# Patient Record
Sex: FEMALE | NOT HISPANIC OR LATINO | Employment: OTHER
[De-identification: names, ages, dates, MRNs, and addresses within clinical notes are randomized per-mention and may not be internally consistent; named-entity substitution may affect disease eponyms.]

---

## 2017-05-24 ENCOUNTER — RECORDS - HEALTHEAST (OUTPATIENT)
Dept: ADMINISTRATIVE | Facility: OTHER | Age: 77
End: 2017-05-24

## 2017-05-31 ENCOUNTER — AMBULATORY - HEALTHEAST (OUTPATIENT)
Dept: HEALTH INFORMATION MANAGEMENT | Facility: CLINIC | Age: 77
End: 2017-05-31

## 2017-06-07 ENCOUNTER — RECORDS - HEALTHEAST (OUTPATIENT)
Dept: ADMINISTRATIVE | Facility: OTHER | Age: 77
End: 2017-06-07

## 2018-05-25 ENCOUNTER — TRANSFERRED RECORDS (OUTPATIENT)
Dept: HEALTH INFORMATION MANAGEMENT | Facility: CLINIC | Age: 78
End: 2018-05-25

## 2018-10-01 ENCOUNTER — TELEPHONE (OUTPATIENT)
Dept: RHEUMATOLOGY | Facility: CLINIC | Age: 78
End: 2018-10-01

## 2018-10-02 NOTE — TELEPHONE ENCOUNTER
Reason for call:  Other   Patient called regarding (reason for call): appointment  Additional comments: Patient is currently seeing a rheumatologist at [facility] and is looking for a new doctor. She has Arthritis and is wondering if Dr. Villa can take her on as a new patient. He is booked for the next couple of months, she want's to know if she can be squeezed into his schedule earlier, or be put onto a cancellation list. Please call patient and follow up. Thanks!    Phone number to reach patient:  Home number on file [phone] (home)    Best Time:  anytime    Can we leave a detailed message on this number?  YES

## 2018-10-05 NOTE — TELEPHONE ENCOUNTER
Rheumatology team: Please call to notify Ms. Palma that she could be added to a wait list.  I do not have an opening available at this time in the next couple months.    Phill Villa MD  10/5/2018 6:32 AM

## 2018-10-05 NOTE — TELEPHONE ENCOUNTER
Left  notifying patient of message below.  Provided her with our call back number w/any questions.    Osei Rodriguez RN....10/5/2018 10:51 AM

## 2019-02-14 ENCOUNTER — ANCILLARY PROCEDURE (OUTPATIENT)
Dept: GENERAL RADIOLOGY | Facility: CLINIC | Age: 79
End: 2019-02-14
Attending: INTERNAL MEDICINE
Payer: COMMERCIAL

## 2019-02-14 ENCOUNTER — TELEPHONE (OUTPATIENT)
Dept: RHEUMATOLOGY | Facility: CLINIC | Age: 79
End: 2019-02-14

## 2019-02-14 ENCOUNTER — OFFICE VISIT (OUTPATIENT)
Dept: RHEUMATOLOGY | Facility: CLINIC | Age: 79
End: 2019-02-14
Payer: COMMERCIAL

## 2019-02-14 VITALS
DIASTOLIC BLOOD PRESSURE: 80 MMHG | SYSTOLIC BLOOD PRESSURE: 162 MMHG | TEMPERATURE: 96.3 F | HEART RATE: 120 BPM | OXYGEN SATURATION: 100 % | WEIGHT: 134.6 LBS

## 2019-02-14 DIAGNOSIS — M54.2 NECK PAIN: ICD-10-CM

## 2019-02-14 DIAGNOSIS — M05.79 RHEUMATOID ARTHRITIS INVOLVING MULTIPLE SITES WITH POSITIVE RHEUMATOID FACTOR (H): ICD-10-CM

## 2019-02-14 DIAGNOSIS — M19.042 PRIMARY OSTEOARTHRITIS OF BOTH HANDS: ICD-10-CM

## 2019-02-14 DIAGNOSIS — M54.2 NECK PAIN: Primary | ICD-10-CM

## 2019-02-14 DIAGNOSIS — M19.041 PRIMARY OSTEOARTHRITIS OF BOTH HANDS: ICD-10-CM

## 2019-02-14 PROBLEM — M81.0 SENILE OSTEOPOROSIS: Status: ACTIVE | Noted: 2019-02-14

## 2019-02-14 PROBLEM — H90.3 SENSORINEURAL HEARING LOSS, BILATERAL: Status: ACTIVE | Noted: 2018-01-09

## 2019-02-14 PROCEDURE — 73130 X-RAY EXAM OF HAND: CPT | Mod: 59

## 2019-02-14 PROCEDURE — 99204 OFFICE O/P NEW MOD 45 MIN: CPT | Performed by: INTERNAL MEDICINE

## 2019-02-14 PROCEDURE — 73630 X-RAY EXAM OF FOOT: CPT | Mod: 59

## 2019-02-14 PROCEDURE — 72050 X-RAY EXAM NECK SPINE 4/5VWS: CPT

## 2019-02-14 RX ORDER — ERGOCALCIFEROL (VITAMIN D2) 10 MCG
TABLET ORAL
COMMUNITY

## 2019-02-14 RX ORDER — HYDROCHLOROTHIAZIDE 25 MG/1
25 TABLET ORAL DAILY
Refills: 3 | COMMUNITY
Start: 2019-02-05

## 2019-02-14 RX ORDER — DICLOFENAC SODIUM 75 MG/1
75 TABLET, DELAYED RELEASE ORAL 2 TIMES DAILY
Refills: 1 | COMMUNITY
Start: 2019-01-11 | End: 2022-04-07

## 2019-02-14 RX ORDER — LEVOTHYROXINE SODIUM 88 UG/1
88 TABLET ORAL DAILY
COMMUNITY
Start: 2019-01-30 | End: 2022-10-14 | Stop reason: DRUGHIGH

## 2019-02-14 RX ORDER — MULTIVITAMIN WITH IRON
1 TABLET ORAL DAILY
COMMUNITY

## 2019-02-14 RX ORDER — LOSARTAN POTASSIUM 50 MG/1
75 TABLET ORAL DAILY
COMMUNITY
Start: 2019-01-11

## 2019-02-14 SDOH — HEALTH STABILITY: MENTAL HEALTH: HOW OFTEN DO YOU HAVE A DRINK CONTAINING ALCOHOL?: NEVER

## 2019-02-14 ASSESSMENT — PAIN SCALES - GENERAL: PAINLEVEL: NO PAIN (0)

## 2019-02-14 NOTE — NURSING NOTE
RAPID3 (0-30) Cumulative Score  5.0          RAPID3 Weighted Score (divide #4 by 3 and that is the weighted score)  1.7       Nelda to follow up with Primary Care provider regarding elevated blood pressure.

## 2019-02-14 NOTE — PROGRESS NOTES
Rheumatology Clinic Visit      Nelda Palma MRN# [id number]   YOB: 1940 Age: 78 year old      Date of visit: 2/14/19   PCP: Dr. Shawn Bradley at the [facility]  Endocrinologist: Dr. Aditya De Leon at [facility].     Chief Complaint   Patient presents with:  RECHECK    Assessment and Plan     1.  Seropositive rheumatoid arthritis (, CCP >2776.8): Reportedly diagnosed in 2004.  Only treatment has been hydroxychloroquine that was effective in the past and tapered off over time.  Currently not on DMARD therapy.  Active synovitis in the hands and wrists.  Evaluate with x-rays for erosive disease.  If she has erosive disease then will start methotrexate 15 mg once weekly.  If no erosive disease then will start hydroxychloroquine as long as her ophthalmologist is okay with it, as the patient raise concern that her ophthalmologist said that Plaquenil is bad for her eyes.  Will request last clinic note and asked Dr. Smith (her ophthalmologist) if any contraindication to starting Plaquenil.  - X-rays today: bilateral hands and feet    # Hydroxychloroquine (Plaquenil) Risks and Benefits:  The risks and benefits of hydroxychloroquine were discussed in detail and the patient verbalized understanding; the patient also verbalized agreement to get the required ophthalmologic toxicity monitoring.  The risks discussed include, but are not limited to, the risk for hypersensitivity, anaphylaxis, anaphylactoid reactions, irreversible retinal damage, rare hematologic reactions, and rare cardiomyopathy. I encouraged reviewing the package insert and asking any questions about the medication.      # Methotrexate Risks and Benefits: The risks and benefits of methotrexate were discussed in detail and the patient verbalized understanding.  The risks discussed include, but are not limited to, the risk for hypersensitivity, anaphylaxis, anaphylactoid reactions, infections, bone marrow suppression, renal  toxicity, hepatotoxicity, pulmonary toxicity, malignancy, impaired fertility, GI upset, alopecia, and oral and nasal sores.  Folic acid supplementation is recommended during methotrexate therapy to help prevent some of the side effects. Pregnancy prevention and planning was discussed; it is recommended that women of childbearing potential use reliable contraception during therapy.  The risks of taking both methotrexate and alcohol were reviewed; complete alcohol avoidance was discussed.  Routine laboratory monitoring is required during methotrexate therapy. Taking MTX once weekly, all within a 24 hour period was stressed and the patient verbalized this instruction back to me.  I encouraged reviewing the package insert and asking any questions about the medication.    2. Neck pain in the setting of RA: reportedly a chronic issue for years.  Check x-rays to eval for instability.  - X-rays today: C-spine, including flexion and extension views    3. Elevated blood pressure:  Nelda to follow up with Primary Care provider regarding elevated blood pressure.     4. Osteoporosis: Managed by her endocrinologist, Dr. Aditya De Leon at [facility].     Ms. Palma verbalized agreement with and understanding of the rational for the diagnosis and treatment plan.  All questions were answered to best of my ability and the patient's satisfaction. Ms. Palma was advised to contact the clinic with any questions that may arise after the clinic visit.      Thank you for involving me in the care of the patient    Return to clinic: 3-4 months      HPI   Nelda Palma is a 78 year old female with a past medical history significant for hypertension, left TKA, migraines, rosacea, bilateral cataract surgery in 2009, hypothyroidism, senile osteoporosis, and rheumatoid arthritis who is seen for initial rheumatology evaluation in this clinic.      1/25/2016 [facility] rheumatology clinic note by Dr. Albin Sanchez documents that the patient has arthritis in her  hands.  Has osteoporosis and has been getting Reclast infusions.  In the assessment and plan he documents history of rheumatoid arthritis but no evidence of inflammatory arthritis disease activity at that time.  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  Left TKA.  Hydroxychloroquine was reduced to 200 mg every other day.    Outside labs from January 2019 show an elevated calcium at 10.7, normal creatinine, elevated total protein, normal white blood cell count, normal hemoglobin, normal platelet count.  2/9/2018 1,25 dihydroxy vitamin D 32.  2/8/2018 TB test negative.  2/8/2018 CCP antibody >2776.8, .  2/7/2018 hepatitis B surface antigen negative.  2/7/2018 hepatitis C antibody negative    Today, Ms. Palma reports that she stopped hydroxychloroquine 3 years ago with no change in symptoms, but in the last year she has had worsening pain and stiffness in her hands and elbows.  Occasionally with neck pain that does not radiate; described as tightness and improves with ROM exercises. Feet pain on the bottom that occur with only the first few steps.  RA first dx'd 2004.     Osteoporosis is being tx'd with Reclast with the last dose last year; follows with endocrinologist Dr. Aditya De Leon at [place].     Denies fevers, chills, nausea, vomiting, constipation, diarrhea. No abdominal pain. No chest pain/pressure, palpitations, or shortness of breath. No LE swelling. No neck pain. No oral or nasal sores.  No rash. No sicca symptoms. No photosensitivity or photophobia. No eye pain or redness. No history of inflammatory eye disease.  No history of inflammatory bowel disease.  No history of DVT, pulmonary embolism, or miscarriage.   No history of serositis.  No history of Raynaud's Phenomenon.  No seizure history.  No known renal disorder.      Tobacco: none  EtOH: none  Drugs: none  Occupation: [employer], now retired    ROS   GEN: No fevers, chills, night sweats, or weight change  SKIN: No itching, rashes,  sores  HEENT: No epistaxis. No oral or nasal ulcers.  CV: No chest pain, pressure, palpitations, or dyspnea on exertion.  PULM: No SOB, wheeze, cough.  GI: No nausea, vomiting, constipation, diarrhea. No blood in stool. No abdominal pain.  : No blood in urine.  MSK: See HPI.  NEURO: No numbness or tingling  ENDO: No heat/cold intolerance.  EXT: No LE swelling  PSYCH: Negative    Active Problem List     Patient Active Problem List   Diagnosis     Actinic keratosis     Essential hypertension     GA (granuloma annulare)     Headache     Hypothyroidism     Meningioma (H)     Rheumatoid arthritis involving both hands with positive rheumatoid factor (H)     Rosacea     Senile osteoporosis     Sensorineural hearing loss, bilateral     Past Medical History   See above    Past Surgical History   Left TKA    Allergy     Allergies   Allergen Reactions     Oxycodone-Acetaminophen Anxiety     After TKA she developed marked post operative anxiety felt to be due to the Percocet; took it again after going home and symptoms recurred.     Penicillins Rash     Current Medication List     Current Outpatient Medications   Medication Sig     diclofenac (VOLTAREN) 1 % topical gel Apply up to 4 times per day to the hands as needed; no more than 2 grams per application to the upper extremities.     levothyroxine (SYNTHROID/LEVOTHROID) 88 MCG tablet Take 88 mcg by mouth daily     losartan (COZAAR) 50 MG tablet Take 75 mg by mouth daily     magnesium 250 MG tablet Take 1 tablet by mouth daily     Vitamin D, Cholecalciferol, 400 units TABS      diclofenac (VOLTAREN) 75 MG EC tablet Take 75 mg by mouth 2 times daily     hydrochlorothiazide (HYDRODIURIL) 25 MG tablet Take 25 mg by mouth daily     No current facility-administered medications for this visit.      Social History   See HPI    Family History     Family History   Problem Relation Age of Onset     Diabetes Mother      Diabetes Father        Physical Exam     Temp Readings from Last 3  Encounters:   02/14/19 96.3  F (35.7  C) (Oral)     BP Readings from Last 5 Encounters:   02/14/19 162/80     Pulse Readings from Last 1 Encounters:   02/14/19 120     Resp Readings from Last 1 Encounters:   No data found for Resp     There is no height or weight on file to calculate BMI.    GEN: NAD  HEENT: MMM. No oral lesions. Anicteric, noninjected sclera  CV: S1, S2. RRR. No m/r/g.  PULM: CTA bilaterally. No w/c.  ABD: +BS.   MSK: Mild synovial swelling and tenderness palpation of the bilateral second and fifth MCPs and PIPs.  Also with bony hypertrophy of the PIPs and DIPs that were mildly tender to palpation.  No soft tissue swelling at the DIPs.  Bilateral wrists with moderate synovial swelling and tenderness palpation but no increased warmth or overlying erythema.  Elbows and shoulders without swelling or tenderness palpation.  Hips nontender to palpation.  Knees and ankles without swelling or tenderness palpation.  MTPs diffusely tender to palpation but without swelling.  No dactylitis.   NEURO: UE and LE strengths 5/5 and equal bilaterally.   SKIN: No rash.  No nail pitting.  EXT: No LE edema  PSYCH: Alert. Appropriate.    Labs / Imaging (select studies)   Labs reviewed in care everywhere    Immunization History     There is no immunization history on file for this patient.       Chart documentation done in part with Dragon Voice recognition Software. Although reviewed after completion, some word and grammatical error may remain.    Phill Villa MD

## 2019-02-14 NOTE — Clinical Note
Please fax my clinic note dated 2/14/2019 to Ms. Palma's [facility]CP[facility] carson MD2/14/2019 3:07 PM

## 2019-02-14 NOTE — TELEPHONE ENCOUNTER
Left message for Dr. Smith, Is there any contraindication for Hydroxychloroquine.   ARTEM has also been faxed for records. ADOLFO Blackman from [facility] returning call from message above. Please call back at [phone]

## 2019-02-14 NOTE — PROGRESS NOTES
Nelda to follow up with Primary Care provider regarding elevated blood pressure.    Penelope Denton, JOAQUIN  2/14/2019  11:42 AM

## 2019-02-15 ENCOUNTER — TELEPHONE (OUTPATIENT)
Dept: RHEUMATOLOGY | Facility: CLINIC | Age: 79
End: 2019-02-15

## 2019-02-15 DIAGNOSIS — Z11.59 ENCOUNTER FOR SCREENING FOR OTHER VIRAL DISEASES: ICD-10-CM

## 2019-02-15 DIAGNOSIS — Z79.899 HIGH RISK MEDICATION USE: ICD-10-CM

## 2019-02-15 DIAGNOSIS — M05.79 RHEUMATOID ARTHRITIS INVOLVING MULTIPLE SITES WITH POSITIVE RHEUMATOID FACTOR (H): Primary | ICD-10-CM

## 2019-02-15 RX ORDER — METHOTREXATE 2.5 MG/1
15 TABLET ORAL WEEKLY
Qty: 24 TABLET | Refills: 3 | Status: SHIPPED | OUTPATIENT
Start: 2019-02-15 | End: 2019-05-21

## 2019-02-15 RX ORDER — FOLIC ACID 1 MG/1
1 TABLET ORAL DAILY
Qty: 100 TABLET | Refills: 3 | Status: SHIPPED | OUTPATIENT
Start: 2019-02-15 | End: 2019-06-06

## 2019-02-15 NOTE — TELEPHONE ENCOUNTER
Rheumatology team: Please call to notify Ms. Palma that hand x-rays showed erosions. Mild osteoarthritis of the feet. Cervical spine x-rays did not show evidence of instability.   Because there are erosions, methotrexate will be used.  We discussed methotrexate at her appointment, but please restate that methotrexate is given once weekly: 15mg (6 tablets) once weekly on the same day each week, and folic acid 1mg daily.  Labs are needed once monthly for the next 3 months; please assist her with scheduling the lab appointments.    - Start methotrexate 15mg (6 tablets) once weekly  - Start folic acid 1mg daily  - Labs in 1 month: CBC, Cr, Hepatic Panel, Hepatitis B core ab  - Labs in 2 months: CBC, Cr, Hepatic Panel  - Labs in 3 months: CBC, Creatinine, Hepatic Panel, ESR, CRP    Phill Villa MD  2/15/2019 1:28 PM

## 2019-02-15 NOTE — TELEPHONE ENCOUNTER
Called and left vm for pt to return call to [phone].    Osei Rodriguez RN....2/15/2019 1:32 PM

## 2019-02-25 ENCOUNTER — TELEPHONE (OUTPATIENT)
Dept: RHEUMATOLOGY | Facility: CLINIC | Age: 79
End: 2019-02-25

## 2019-02-25 NOTE — TELEPHONE ENCOUNTER
Reason for call:  Other   Patient called regarding (reason for call): call back  Additional comments: Patient has questions regarding medications Dr. Villa prescribed.    Phone number to reach patient:  Home number on file [phone] (home)    Best Time:  ASAP    Can we leave a detailed message on this number?  YES

## 2019-02-25 NOTE — TELEPHONE ENCOUNTER
Called pt and informed her of Dr. Baker notes form [facility]. She will take the methotrexate for 3 months and come in for a follow up.  April St Yon NUÑEZ 2/25/2019 3:53 PM

## 2019-02-28 ENCOUNTER — TELEPHONE (OUTPATIENT)
Dept: RHEUMATOLOGY | Facility: CLINIC | Age: 79
End: 2019-02-28

## 2019-02-28 NOTE — TELEPHONE ENCOUNTER
Reason for call:  Other   Patient called regarding (reason for call): call back  Additional comments: Patient does labs at [facility] - would like the orders sent there. Dr. Gomez.    Phone number to reach patient:  Home number on file [phone] (home)    Best Time:  ASAP    Can we leave a detailed message on this number?  YES

## 2019-03-04 NOTE — TELEPHONE ENCOUNTER
Labs have been faxed to [facility] (fax number [phone])  Patient has been notified.  Abi Sauceda CMA Rheumatology  3/4/2019 10:05 AM

## 2019-03-08 DIAGNOSIS — M05.79 RHEUMATOID ARTHRITIS INVOLVING MULTIPLE SITES WITH POSITIVE RHEUMATOID FACTOR (H): ICD-10-CM

## 2019-03-08 RX ORDER — METHOTREXATE 2.5 MG/1
15 TABLET ORAL WEEKLY
Qty: 24 TABLET | Refills: 3
Start: 2019-03-08

## 2019-03-11 ENCOUNTER — TELEPHONE (OUTPATIENT)
Dept: RHEUMATOLOGY | Facility: CLINIC | Age: 79
End: 2019-03-11

## 2019-03-11 DIAGNOSIS — M19.041 PRIMARY OSTEOARTHRITIS OF BOTH HANDS: ICD-10-CM

## 2019-03-11 DIAGNOSIS — M19.042 PRIMARY OSTEOARTHRITIS OF BOTH HANDS: ICD-10-CM

## 2019-03-11 NOTE — TELEPHONE ENCOUNTER
Reason for Call:  Other call back    Detailed comments: [facility] calling, order sent but need diagnostic code. Please advise.     Phone Number Patient can be reached at: Other phone number:  [phone]    Best Time: before 3     Can we leave a detailed message on this number? NO    Call taken on 3/11/2019 at 1:54 PM by Nicolas Sanchez

## 2019-03-11 NOTE — TELEPHONE ENCOUNTER
Called [facility] and provided diagnoses codes for labs.    Osei Rodriguez RN....3/11/2019 2:03 PM      Declines

## 2019-03-20 ENCOUNTER — TRANSFERRED RECORDS (OUTPATIENT)
Dept: HEALTH INFORMATION MANAGEMENT | Facility: CLINIC | Age: 79
End: 2019-03-20

## 2019-03-20 LAB
ALT SERPL-CCNC: 11 IU/L (ref 8–45)
AST SERPL-CCNC: 22 IU/L (ref 2–40)
CREAT SERPL-MCNC: 0.82 MG/DL (ref 0.57–1.11)
GFR SERPL CREATININE-BSD FRML MDRD: >60 ML/MIN/1.73M2

## 2019-03-29 ENCOUNTER — TELEPHONE (OUTPATIENT)
Dept: RHEUMATOLOGY | Facility: CLINIC | Age: 79
End: 2019-03-29

## 2019-03-29 NOTE — TELEPHONE ENCOUNTER
Rheumatology team: Please call to notify Ms. Palma that 3/21/2019 labs from [facility] did not show evidence of medication toxicity.     Phill Villa MD  3/29/2019 4:05 PM

## 2019-04-01 NOTE — TELEPHONE ENCOUNTER
Notified patient of lab results, patient understood and had no questions.  Abi Sauceda CMA Rheumatology  4/1/2019 3:52 PM

## 2019-05-15 ENCOUNTER — TRANSFERRED RECORDS (OUTPATIENT)
Dept: HEALTH INFORMATION MANAGEMENT | Facility: CLINIC | Age: 79
End: 2019-05-15

## 2019-05-15 LAB
ALT SERPL-CCNC: 9 IU/L (ref 8–45)
AST SERPL-CCNC: 20 IU/L (ref 2–40)
CREAT SERPL-MCNC: 0.83 MG/DL (ref 0.57–1.11)
GFR SERPL CREATININE-BSD FRML MDRD: >60 ML/MIN/1.73M2

## 2019-05-21 DIAGNOSIS — M05.79 RHEUMATOID ARTHRITIS INVOLVING MULTIPLE SITES WITH POSITIVE RHEUMATOID FACTOR (H): ICD-10-CM

## 2019-05-21 RX ORDER — METHOTREXATE 2.5 MG/1
15 TABLET ORAL WEEKLY
Qty: 24 TABLET | Refills: 0 | Status: SHIPPED | OUTPATIENT
Start: 2019-05-21 | End: 2019-06-06

## 2019-05-22 NOTE — TELEPHONE ENCOUNTER
Rheumatology team: Please call to notify Ms. Palma that methotrexate has been refilled.  Phill Villa MD  5/21/2019 10:02 PM

## 2019-05-22 NOTE — TELEPHONE ENCOUNTER
Contacted Pt, notified Ms. Palma that methotrexate has been refilled.  Pt had no questions or concerns, agrees and understands.    Penelope Denton, CMA  5/22/2019  1:10 PM

## 2019-06-06 ENCOUNTER — OFFICE VISIT (OUTPATIENT)
Dept: RHEUMATOLOGY | Facility: CLINIC | Age: 79
End: 2019-06-06
Payer: COMMERCIAL

## 2019-06-06 VITALS
WEIGHT: 132.2 LBS | SYSTOLIC BLOOD PRESSURE: 122 MMHG | HEART RATE: 65 BPM | TEMPERATURE: 97.9 F | DIASTOLIC BLOOD PRESSURE: 77 MMHG | OXYGEN SATURATION: 98 %

## 2019-06-06 DIAGNOSIS — M05.79 RHEUMATOID ARTHRITIS INVOLVING MULTIPLE SITES WITH POSITIVE RHEUMATOID FACTOR (H): Primary | ICD-10-CM

## 2019-06-06 DIAGNOSIS — Z79.899 HIGH RISK MEDICATION USE: ICD-10-CM

## 2019-06-06 DIAGNOSIS — M72.2 PLANTAR FASCIITIS, BILATERAL: ICD-10-CM

## 2019-06-06 DIAGNOSIS — M25.561 CHRONIC PAIN OF BOTH KNEES: ICD-10-CM

## 2019-06-06 DIAGNOSIS — M54.2 NECK PAIN: ICD-10-CM

## 2019-06-06 DIAGNOSIS — M25.562 CHRONIC PAIN OF BOTH KNEES: ICD-10-CM

## 2019-06-06 DIAGNOSIS — G89.29 CHRONIC PAIN OF BOTH KNEES: ICD-10-CM

## 2019-06-06 PROCEDURE — 99214 OFFICE O/P EST MOD 30 MIN: CPT | Performed by: INTERNAL MEDICINE

## 2019-06-06 RX ORDER — FOLIC ACID 1 MG/1
1 TABLET ORAL DAILY
Qty: 100 TABLET | Refills: 3 | Status: SHIPPED | OUTPATIENT
Start: 2019-06-06 | End: 2020-02-06

## 2019-06-06 RX ORDER — METHOTREXATE 2.5 MG/1
15 TABLET ORAL WEEKLY
Qty: 24 TABLET | Refills: 3 | Status: SHIPPED | OUTPATIENT
Start: 2019-06-06 | End: 2019-09-23

## 2019-06-06 ASSESSMENT — PAIN SCALES - GENERAL: PAINLEVEL: SEVERE PAIN (6)

## 2019-06-06 NOTE — Clinical Note
Please fax my clinic note dated 6/6/2019 to Ms. Palma's PCP:Dr. Shawn Bradley at the [facility] [place] Phill roberson MD6/6/2019 12:44 PM

## 2019-06-06 NOTE — PROGRESS NOTES
Rheumatology Clinic Visit      Nelda Palma MRN# [id number]   YOB: 1940 Age: 78 year old      Date of visit: 6/06/19   PCP: Dr. Shawn Bradley at the [facility]  Endocrinologist: Dr. Aditya De Leon at [facility].     Chief Complaint   Patient presents with:  RECHECK: 4 mth follow up re: RA.  Pt states she is not understanding reasoning of taking medication.  Pt is not taking any medication for pain.  Pt states left side of neck is painful wondering if that is arthritis related or something else, also experiencing hand pain and ankles, and knees that are intermittent.     Assessment and Plan     1.  Seropositive erosive rheumatoid arthritis (, CCP >2776.8): Reportedly diagnosed in 2004.  Reportedly on hydroxychloroquine in the past that was effective and slowly tapered off.  Note that the patient reports her ophthalmologist telling her that Plaquenil can be very toxic to her eyes and the patient is under the impression that she is not supposed to restart Plaquenil.  Ophthalmology records available for review did not document a contraindication to using Plaquenil.  Regardless, she has erosive disease so methotrexate was started and she is improved on this regimen.  - Continue methotrexate 15mg (6 tablets) once weekly  - Continue folic acid 1mg daily  - Labs in mid-August: CBC, Cr, Hepatic Panel, ESR, CRP (printed and faxed to the [facility] clinic)    2. Neck pain in the setting of RA: reportedly a chronic issue for years.  No instability or degenerative change seen on 2/14/2019 x-ray.  Affecting the upper trapezius on the right. Refer to PT  - PT referral    3. Bilateral knee pain: mild.  Crepitation on exam.  By history it is suggestive of patellofemoral syndrome.  Start with PT.  X-rays in the future if needed  - PT referral    4. Bilateral plantar fasciitis: reviewed the dx and tx options. shoewear reviewed.  Stretching exercises reviewed.  She is also planning to see a  podiatrist.      5. Osteoporosis: Managed by her endocrinologist, Dr. Aditya De Leon at [facility].     Ms. Palma verbalized agreement with and understanding of the rational for the diagnosis and treatment plan.  All questions were answered to best of my ability and the patient's satisfaction. Ms. Palma was advised to contact the clinic with any questions that may arise after the clinic visit.      Thank you for involving me in the care of the patient    Return to clinic: 3-4 months      HPI   Nelda Palma is a 78 year old female with a past medical history significant for hypertension, left TKA, migraines, rosacea, bilateral cataract surgery in 2009, hypothyroidism, senile osteoporosis, and rheumatoid arthritis who is seen for initial rheumatology evaluation in this clinic.      1/25/2016 [facility] rheumatology clinic note by Dr. Albin Sanchez documents that the patient has arthritis in her hands.  Has osteoporosis and has been getting Reclast infusions.  In the assessment and plan he documents history of rheumatoid arthritis but no evidence of inflammatory arthritis disease activity at that time.  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  Left TKA.  Hydroxychloroquine was reduced to 200 mg every other day.    Outside labs from January 2019 show an elevated calcium at 10.7, normal creatinine, elevated total protein, normal white blood cell count, normal hemoglobin, normal platelet count.  2/9/2018 1,25 dihydroxyvitamin D 32.  2/8/2018 TB test negative.  2/8/2018 CCP antibody >2776.8, .  2/7/2018 hepatitis B surface antigen negative.  2/7/2018 hepatitis C antibody negative    2/14/2019:  Ms. Palma reported that she stopped hydroxychloroquine 3 years ago with no change in symptoms, but in the last year she has had worsening pain and stiffness in her hands and elbows.  Occasionally with neck pain that does not radiate; described as tightness and improves with ROM exercises. Feet pain on the bottom that occur with only the  first few steps.  RA first dx'd 2004.  Osteoporosis is being managed by her endocrinologist Dr. Aditya De Leon at [facility].    Since last seen, x-rays showed erosions and mild OA of the feet.  Cervical spine x-rays did not show evidence of instability.   MTX 15mg once weekly and folic acid were started.     Today, 6/6/2019, she arrived 15 minutes late for her appointment.  She reports reports that methotrexate is helping some.  She is tolerating methotrexate well.  Pain in the fingers as described as zaps of pain that occur spontaneously and lasts for no more than a few seconds at a time.  Morning stiffness for approximately 45-60 minutes.  No joint swelling.  She also reports continued neck pain that tends to radiate to the right upper back.  Bilateral knee pain that is worse with activity and improves with rest; much worse with going up and down stairs and standing up from a low seated position.  She also reports plantar fasciitis has been an ongoing issue and she plans to see a podiatrist soon; she has pain on the plantar aspect of both feet with the first few steps after a period of inactivity; this improves with time.  She says that she tried canelo chi but her plantar fasciitis acted up so she has held off going back to canelo chi until her plantar fasciitis is better controlled.    Denies fevers, chills, nausea, vomiting, constipation, diarrhea. No abdominal pain. No chest pain/pressure, palpitations, or shortness of breath. No LE swelling. No neck pain. No oral or nasal sores.  No rash. No sicca symptoms. No photosensitivity or photophobia. No eye pain or redness. No history of inflammatory eye disease.  No history of inflammatory bowel disease.  No history of DVT, pulmonary embolism, or miscarriage.   No history of serositis.  No history of Raynaud's Phenomenon.  No seizure history.  No known renal disorder.      Tobacco: none  EtOH: none  Drugs: none  Occupation: [employer], now retired    ROS   GEN:  No fevers, chills, night sweats, or weight change  SKIN: No itching, rashes, sores  HEENT: No epistaxis. No oral or nasal ulcers.  CV: No chest pain, pressure, palpitations, or dyspnea on exertion.  PULM: No SOB, wheeze, cough.  GI: No nausea, vomiting, constipation, diarrhea. No blood in stool. No abdominal pain.  : No blood in urine.  MSK: See HPI.  NEURO: No numbness or tingling  ENDO: No heat/cold intolerance.  EXT: No LE swelling  PSYCH: Negative    Active Problem List     Patient Active Problem List   Diagnosis     Actinic keratosis     Essential hypertension     GA (granuloma annulare)     Headache     Hypothyroidism     Meningioma (H)     Rheumatoid arthritis involving both hands with positive rheumatoid factor (H)     Rosacea     Senile osteoporosis     Sensorineural hearing loss, bilateral     Past Medical History   See above    Past Surgical History   Left TKA    Allergy     Allergies   Allergen Reactions     Oxycodone-Acetaminophen Anxiety     After TKA she developed marked post operative anxiety felt to be due to the Percocet; took it again after going home and symptoms recurred.     Penicillins Rash     Current Medication List     Current Outpatient Medications   Medication Sig     folic acid (FOLVITE) 1 MG tablet Take 1 tablet (1 mg) by mouth daily     hydrochlorothiazide (HYDRODIURIL) 25 MG tablet Take 25 mg by mouth daily     levothyroxine (SYNTHROID/LEVOTHROID) 88 MCG tablet Take 88 mcg by mouth daily     losartan (COZAAR) 50 MG tablet Take 75 mg by mouth daily     magnesium 250 MG tablet Take 1 tablet by mouth daily     methotrexate sodium 2.5 MG TABS Take 6 tablets (15 mg) by mouth once a week . Take all 6 tablets on the same day of each week.     Vitamin D, Cholecalciferol, 400 units TABS      diclofenac (VOLTAREN) 1 % topical gel Apply up to 4 times per day to the hands as needed; no more than 2 grams per application to the upper extremities. (Patient not taking: Reported on 6/6/2019)      diclofenac (VOLTAREN) 75 MG EC tablet Take 75 mg by mouth 2 times daily     No current facility-administered medications for this visit.      Social History   See HPI    Family History     Family History   Problem Relation Age of Onset     Diabetes Mother      Diabetes Father        Physical Exam     Temp Readings from Last 3 Encounters:   06/06/19 97.9  F (36.6  C) (Oral)   02/14/19 96.3  F (35.7  C) (Oral)     BP Readings from Last 5 Encounters:   06/06/19 122/77   02/14/19 162/80     Pulse Readings from Last 1 Encounters:   06/06/19 65     Resp Readings from Last 1 Encounters:   No data found for Resp     There is no height or weight on file to calculate BMI.    GEN: NAD  HEENT: MMM. No oral lesions. Anicteric, noninjected sclera  CV: S1, S2. RRR. No m/r/g.  PULM: CTA bilaterally. No w/c.  ABD: +BS.   MSK: Mild synovial swelling without tenderness palpation of the bilateral second and third MCPs.  Other MCPs and PIPs without swelling or tenderness palpation.  Bony hypertrophy of the PIPs and DIPs that were not tender to palpation.  Wrists without swelling or tenderness palpation.  Elbows without swelling or tenderness palpation.  Shoulders without swelling or tenderness to palpation; normal range of motion.  Neck range of motion did not cause pain.  Tender to palpation over the right upper trapezius. Knees with mild medial joint line tenderness and crepitation but no effusion or increased warmth.  Ankles and MTPs without swelling or tenderness to palpation.   NEURO: UE and LE strengths 5/5 and equal bilaterally.   SKIN: No rash.  No nail pitting.  EXT: No LE edema  PSYCH: Alert. Appropriate.    Labs / Imaging (select studies)   Labs reviewed in care everywhere    Immunization History     There is no immunization history on file for this patient.       Chart documentation done in part with Dragon Voice recognition Software. Although reviewed after completion, some word and grammatical error may remain.    Phill  MD Allen

## 2019-06-06 NOTE — NURSING NOTE
RAPID3 (0-30) Cumulative Score  5.8          RAPID3 Weighted Score (divide #4 by 3 and that is the weighted score)  1.9         Penelope Denton, JOAQUIN  6/6/2019  10:54 AM

## 2019-06-06 NOTE — PROGRESS NOTES
Faxed Dr Baker clinic note dated 6/6/2019 to Ms. Palma's PCP: Dr. Shawn Bradley at the [facility].    P) [phone]  F) [phone]    Penelope Denton CMA  6/6/2019  2:09 PM

## 2019-06-14 ENCOUNTER — THERAPY VISIT (OUTPATIENT)
Dept: PHYSICAL THERAPY | Facility: CLINIC | Age: 79
End: 2019-06-14
Attending: INTERNAL MEDICINE
Payer: COMMERCIAL

## 2019-06-14 DIAGNOSIS — M25.561 CHRONIC PAIN OF BOTH KNEES: ICD-10-CM

## 2019-06-14 DIAGNOSIS — M25.562 CHRONIC PAIN OF BOTH KNEES: ICD-10-CM

## 2019-06-14 DIAGNOSIS — G89.29 CHRONIC PAIN OF BOTH KNEES: ICD-10-CM

## 2019-06-14 DIAGNOSIS — M54.2 NECK PAIN: ICD-10-CM

## 2019-06-14 DIAGNOSIS — M72.2 PLANTAR FASCIITIS, BILATERAL: ICD-10-CM

## 2019-06-14 PROCEDURE — 97161 PT EVAL LOW COMPLEX 20 MIN: CPT | Mod: GP | Performed by: PHYSICAL THERAPIST

## 2019-06-14 PROCEDURE — 97112 NEUROMUSCULAR REEDUCATION: CPT | Mod: GP | Performed by: PHYSICAL THERAPIST

## 2019-06-14 PROCEDURE — 97530 THERAPEUTIC ACTIVITIES: CPT | Mod: GP | Performed by: PHYSICAL THERAPIST

## 2019-06-14 ASSESSMENT — ACTIVITIES OF DAILY LIVING (ADL)
SQUAT: I AM UNABLE TO DO THE ACTIVITY
LIMPING: I DO NOT HAVE THE SYMPTOM
RISE FROM A CHAIR: ACTIVITY IS MINIMALLY DIFFICULT
WEAKNESS: I DO NOT HAVE THE SYMPTOM
PAIN: THE SYMPTOM AFFECTS MY ACTIVITY SLIGHTLY
HOW_WOULD_YOU_RATE_THE_OVERALL_FUNCTION_OF_YOUR_KNEE_DURING_YOUR_USUAL_DAILY_ACTIVITIES?: NEARLY NORMAL
GO DOWN STAIRS: ACTIVITY IS MINIMALLY DIFFICULT
KNEE_ACTIVITY_OF_DAILY_LIVING_SCORE: 75.71
SIT WITH YOUR KNEE BENT: ACTIVITY IS NOT DIFFICULT
AS_A_RESULT_OF_YOUR_KNEE_INJURY,_HOW_WOULD_YOU_RATE_YOUR_CURRENT_LEVEL_OF_DAILY_ACTIVITY?: NEARLY NORMAL
STIFFNESS: THE SYMPTOM AFFECTS MY ACTIVITY SLIGHTLY
STAND: ACTIVITY IS NOT DIFFICULT
KNEEL ON THE FRONT OF YOUR KNEE: I AM UNABLE TO DO THE ACTIVITY
HOW_WOULD_YOU_RATE_THE_CURRENT_FUNCTION_OF_YOUR_KNEE_DURING_YOUR_USUAL_DAILY_ACTIVITIES_ON_A_SCALE_FROM_0_TO_100_WITH_100_BEING_YOUR_LEVEL_OF_KNEE_FUNCTION_PRIOR_TO_YOUR_INJURY_AND_0_BEING_THE_INABILITY_TO_PERFORM_ANY_OF_YOUR_USUAL_DAILY_ACTIVITIES?: 40
GO UP STAIRS: ACTIVITY IS MINIMALLY DIFFICULT
WALK: ACTIVITY IS NOT DIFFICULT
SWELLING: I DO NOT HAVE THE SYMPTOM
KNEE_ACTIVITY_OF_DAILY_LIVING_SUM: 53
GIVING WAY, BUCKLING OR SHIFTING OF KNEE: I DO NOT HAVE THE SYMPTOM
RAW_SCORE: 53

## 2019-06-14 NOTE — PROGRESS NOTES
[facility] Initial Evaluation  Subjective:  The history is provided by the patient. No  was used.       Nelda Palma  is a 78 year old  female referred to physical therapy by Phill Villa MD for treatment of neck pain, chronic pain of both knees and bilateral plantar fasciitis.      DOI/onset 6/6/19 (MD order)  Mechanism of injury insidious    Chief Complaint:   Right side of her neck and upper shoulder. She reports her pain got worse this past winter with shoveling. She was sweeping the cotton that from the trees yesterday, and believes that his why her neck is so sore today. She denies having many headaches. Nelda reports that both of her knees have felt stiff in the past few months. She has the most pain in her knees when going to bed at night and stairs.   Pain location: upper right neck/shoulder, knees: anterior below knee cap bilaterally.  She notes pain in her right heel when she is weightbearing. She stopped participating in canelo chi due to right heel pain. She wears good supportive shoes with insoles. About 5 years ago she fell back and hit her upper back resulting in chronic upper back discomfort. 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  Quality: neck: sharp pain (primarily with rotation to R side), right heel pain and both knees: achy   Constant/Intermittent: constant  Time of day: during the day  Symptoms have worsening since onset.    Current pain 7/10.  Pain at best 3/10.  Pain at worst 7/10.    Symptoms aggravated by weight bearing in right heel, rotating neck, stairs  Symptoms improved with heating pad, and rotating head side to side, rest, walks .     Social history:  Walks every day for 30-60 mintues.    Occupation: retired.    Patient having difficulty with ADLs: stairs, driving, sleping.    Patient's goals are reduce neck pain.    Patient reports general health as good.  Related medical history hypertension, left TKA,  migraines, rosacea, bilateral cataract surgery in 2009, hypothyroidism, senile osteoporosis, and rheumatoid arthritis.    Surgical History:  Left TKA.    Imaging: x-ray.    Medications:  Thyroid, and high blood pressure medication..       Outcome measure:   Knee Outcome Measure, and Neck Disability Index, refer to flowsheet  Return to MD:  10/10/19.      Clinical Impression: Nelda is a 78 year old female that presents to physical therapy with neck pain, chronic knee pain, and right heel pain. She demonstrates pain, decreased ROM, decreased strength, decreased flexibility, and poor posturing. She will benefit from skilled physical therapy to address impairments listed below to all her to return to prior level of function with reduced pain.    T9 on R, T6 L           Objective:  Standing Alignment:    Cervical/Thoracic:  Forward head and thoracic kyphosis increased                    Flexibility/Screens:     Upper Extremity:    Decreased left upper extremity flexibility at:  Pectoralis Major and Pectoralis Minor    Decreased right upper extremity flexibility present at:  Pectoralis Major    Spine:  Decreased left spine flexibility:  Upper Trap and Levator    Decreased right spine flexibility:  Upper Trap and Levator                  Cervical/Thoracic Evaluation  Arom wnl thoracic: minimal thoracic mobility.  AROM:  AROM Cervical:    Flexion:            30  Extension:       20, pain/pulling  Rotation:         Left: 60, pulling     Right: 50, sligh pulling  Side Bend:      Left: 20, pulling     Right:  29                Cervical Palpation:  : ttp right first rib, bilateral: scalenes, upper trap and levator scapuale.        Cervical Stability/Joint Clearing:      Right positive at:  1st Rib and Mobility           Shoulder Evaluation:  ROM:  AROM:  : WNL bilaterally.                    Elbow Extension:  Left:  T6   Right:  T9              Strength:    Flexion: Left:4+/5   Pain:    Right: 4+/5     Pain:   Extension:  Left:  4+/5    Pain:    Right: 4+/5    Pain:  Abduction:  Left: 4+/5  Pain:                                                       Knee Evaluation:        Palpation:  Palpation of knee: ttp bilateral patellar tendons, no tenderness noted posterior knee and along joint lines bilaterally.                General     ROS    Assessment/Plan:    Patient is a 78 year old female with cervical, both sides knee and both sides ankle complaints.    Patient has the following significant findings with corresponding treatment plan.                Diagnosis 1:  Neck pain  Pain -  hot/cold therapy, manual therapy, self management, education, directional preference exercise and home program  Decreased ROM/flexibility - manual therapy, therapeutic exercise, therapeutic activity and home program  Decreased joint mobility - manual therapy, therapeutic exercise, therapeutic activity and home program  Decreased strength - therapeutic exercise, therapeutic activities and home program  Impaired posture - neuro re-education, therapeutic activities and home program  Diagnosis 2:  Bilateral knee pain   Pain -  hot/cold therapy, manual therapy, self management, education and home program  Decreased function - therapeutic activities, home program and functional performance testing  Diagnosis 3:  Right ankle pain  Pain -  hot/cold therapy, US, manual therapy, self management, education and home program  Decreased function - therapeutic activities, home program and functional performance testing     Therapy Evaluation Codes:   Cumulative Therapy Evaluation is: Low complexity.    Previous and current functional limitations:  (See Goal Flow Sheet for this information)    Short term and Long term goals: (See Goal Flow Sheet for this information)     Communication ability:  Patient appears to be able to clearly communicate and understand verbal and written communication and follow directions correctly.  Treatment Explanation - The following has been discussed  with the patient:   RX ordered/plan of care  Anticipated outcomes  Possible risks and side effects  This patient would benefit from PT intervention to resume normal activities.   Rehab potential is good.    Frequency:  1 X week, once daily  Duration:  for 8 weeks  Discharge Plan:  Achieve all LTG.  Independent in home treatment program.  Reach maximal therapeutic benefit.    Please refer to the daily flowsheet for treatment today, total treatment time and time spent performing 1:1 timed codes.

## 2019-06-21 ENCOUNTER — THERAPY VISIT (OUTPATIENT)
Dept: PHYSICAL THERAPY | Facility: CLINIC | Age: 79
End: 2019-06-21
Payer: COMMERCIAL

## 2019-06-21 DIAGNOSIS — M54.2 NECK PAIN: Primary | ICD-10-CM

## 2019-06-21 DIAGNOSIS — M25.562 CHRONIC PAIN OF BOTH KNEES: ICD-10-CM

## 2019-06-21 DIAGNOSIS — G89.29 CHRONIC PAIN OF BOTH KNEES: ICD-10-CM

## 2019-06-21 DIAGNOSIS — M25.561 CHRONIC PAIN OF BOTH KNEES: ICD-10-CM

## 2019-06-21 PROCEDURE — 97110 THERAPEUTIC EXERCISES: CPT | Mod: GP

## 2019-06-21 PROCEDURE — 97140 MANUAL THERAPY 1/> REGIONS: CPT | Mod: GP

## 2019-06-21 PROCEDURE — 97112 NEUROMUSCULAR REEDUCATION: CPT | Mod: GP

## 2019-06-28 ENCOUNTER — THERAPY VISIT (OUTPATIENT)
Dept: PHYSICAL THERAPY | Facility: CLINIC | Age: 79
End: 2019-06-28
Payer: COMMERCIAL

## 2019-06-28 DIAGNOSIS — M25.562 CHRONIC PAIN OF BOTH KNEES: Primary | ICD-10-CM

## 2019-06-28 DIAGNOSIS — M54.2 NECK PAIN: ICD-10-CM

## 2019-06-28 DIAGNOSIS — G89.29 CHRONIC PAIN OF BOTH KNEES: Primary | ICD-10-CM

## 2019-06-28 DIAGNOSIS — M25.561 CHRONIC PAIN OF BOTH KNEES: Primary | ICD-10-CM

## 2019-06-28 PROCEDURE — 97112 NEUROMUSCULAR REEDUCATION: CPT | Mod: GP

## 2019-06-28 PROCEDURE — 97140 MANUAL THERAPY 1/> REGIONS: CPT | Mod: GP

## 2019-06-28 PROCEDURE — 97110 THERAPEUTIC EXERCISES: CPT | Mod: GP

## 2019-06-28 NOTE — PROGRESS NOTES
Subjective:  HPI                    Objective:  System    Physical Exam    General     ROS    Assessment/Plan:    SUBJECTIVE  Subjective changes as noted by pt:  Today knees are more stiff than usual. L is always stiff. After last PT appt pt was tired, but ok. The leg exercises at home are challenging. Neck feels pretty good, but it's L upper trap and upper back pain that limits how long she can walk while shopping.    Current pain level: 4/10 Current Pain level: 4/10(4/10 in L knee, 1/10 in neck)   Changes in function:  Yes (See Goal flowsheet attached for changes in current functional level)     Adverse reaction to treatment or activity:  None    OBJECTIVE  Changes in objective findings:  Yes,   Objective: Mod tender L UT, rhom with min+ TP noted. Good xu to scapular work in standing and bent over positions. L quad strength 4/5 L knee AROM 0-0-125.      ASSESSMENT  Nelda continues to require intervention to meet STG and LTG's: PT  Patient is progressing as expected.  Response to therapy has shown an improvement in  pain level, ROM  and muscle control  Progress made towards STG/LTG?  Yes (See Goal flowsheet attached for updates on achievement of STG and LTG)    PLAN  Current treatment program is being advanced to more complex exercises. Re check in 2 weeks, if doing well, discontinue to HEP    PTA/ATC plan:  Will continue with present plan of care.    Please refer to the daily flowsheet for treatment today, total treatment time and time spent performing 1:1 timed codes.

## 2019-07-15 ENCOUNTER — TRANSFERRED RECORDS (OUTPATIENT)
Dept: HEALTH INFORMATION MANAGEMENT | Facility: CLINIC | Age: 79
End: 2019-07-15

## 2019-07-15 LAB
ALT SERPL-CCNC: 9 IU/L (ref 8–45)
AST SERPL-CCNC: 20 IU/L (ref 2–40)
CREAT SERPL-MCNC: 0.79 MG/DL (ref 0.57–1.11)
GFR SERPL CREATININE-BSD FRML MDRD: >60 ML/MIN/1.73M2

## 2019-07-19 ENCOUNTER — THERAPY VISIT (OUTPATIENT)
Dept: PHYSICAL THERAPY | Facility: CLINIC | Age: 79
End: 2019-07-19
Payer: COMMERCIAL

## 2019-07-19 DIAGNOSIS — G89.29 CHRONIC PAIN OF BOTH KNEES: Primary | ICD-10-CM

## 2019-07-19 DIAGNOSIS — M25.562 CHRONIC PAIN OF BOTH KNEES: Primary | ICD-10-CM

## 2019-07-19 DIAGNOSIS — M54.2 NECK PAIN: ICD-10-CM

## 2019-07-19 DIAGNOSIS — M25.561 CHRONIC PAIN OF BOTH KNEES: Primary | ICD-10-CM

## 2019-07-19 PROCEDURE — 97110 THERAPEUTIC EXERCISES: CPT | Mod: GP

## 2019-07-19 PROCEDURE — 97140 MANUAL THERAPY 1/> REGIONS: CPT | Mod: GP

## 2019-07-19 PROCEDURE — 97112 NEUROMUSCULAR REEDUCATION: CPT | Mod: GP

## 2019-07-24 ENCOUNTER — TELEPHONE (OUTPATIENT)
Dept: RHEUMATOLOGY | Facility: CLINIC | Age: 79
End: 2019-07-24

## 2019-07-24 NOTE — TELEPHONE ENCOUNTER
Rheumatology team: Please call to notify Ms. Palma that the 7/15/2019 labs performed at [facility] did not suggest methotrexate toxicity.  No change to the rheumatology treatment plan.    Phill Villa MD  7/24/2019 1:08 PM

## 2019-09-23 DIAGNOSIS — M05.79 RHEUMATOID ARTHRITIS INVOLVING MULTIPLE SITES WITH POSITIVE RHEUMATOID FACTOR (H): ICD-10-CM

## 2019-09-23 RX ORDER — METHOTREXATE 2.5 MG/1
15 TABLET ORAL WEEKLY
Qty: 24 TABLET | Refills: 1 | Status: SHIPPED | OUTPATIENT
Start: 2019-09-23 | End: 2019-10-10

## 2019-09-23 NOTE — TELEPHONE ENCOUNTER
Routing refill request to provider for review/approval because:  Drug not on the [facility] refill protocol     Requested Prescriptions   Pending Prescriptions Disp Refills     methotrexate sodium 2.5 MG TABS 24 tablet 3     Sig: Take 6 tablets (15 mg) by mouth once a week . Take all 6 tablets on the same day of each week.       There is no refill protocol information for this order        Carolyn Lafleur RN

## 2019-09-23 NOTE — TELEPHONE ENCOUNTER
Rheumatology team: Please call to notify Ms. Palma that methotrexate has been refilled.  Phill Villa MD  9/23/2019 4:07 PM     negative...

## 2019-09-24 NOTE — TELEPHONE ENCOUNTER
Message left for patient that a prescription has been refilled and sent to pharmacy, any questions please call us at [phone].     Abi Sauceda CMA Rheumatology  9/24/2019 10:55 AM

## 2019-10-10 ENCOUNTER — OFFICE VISIT (OUTPATIENT)
Dept: RHEUMATOLOGY | Facility: CLINIC | Age: 79
End: 2019-10-10
Payer: COMMERCIAL

## 2019-10-10 VITALS
OXYGEN SATURATION: 100 % | BODY MASS INDEX: 23.39 KG/M2 | DIASTOLIC BLOOD PRESSURE: 80 MMHG | SYSTOLIC BLOOD PRESSURE: 116 MMHG | WEIGHT: 132 LBS | HEART RATE: 57 BPM | HEIGHT: 63 IN

## 2019-10-10 DIAGNOSIS — Z11.59 ENCOUNTER FOR SCREENING FOR OTHER VIRAL DISEASES: ICD-10-CM

## 2019-10-10 DIAGNOSIS — Z79.899 HIGH RISK MEDICATION USE: ICD-10-CM

## 2019-10-10 DIAGNOSIS — M05.79 RHEUMATOID ARTHRITIS INVOLVING MULTIPLE SITES WITH POSITIVE RHEUMATOID FACTOR (H): Primary | ICD-10-CM

## 2019-10-10 LAB
ALBUMIN SERPL-MCNC: 4 G/DL (ref 3.4–5)
ALP SERPL-CCNC: 40 U/L (ref 40–150)
ALT SERPL W P-5'-P-CCNC: 21 U/L (ref 0–50)
AST SERPL W P-5'-P-CCNC: 24 U/L (ref 0–45)
BASOPHILS # BLD AUTO: 0 10E9/L (ref 0–0.2)
BASOPHILS NFR BLD AUTO: 0.3 %
BILIRUB DIRECT SERPL-MCNC: 0.1 MG/DL (ref 0–0.2)
BILIRUB SERPL-MCNC: 0.4 MG/DL (ref 0.2–1.3)
CREAT SERPL-MCNC: 0.78 MG/DL (ref 0.52–1.04)
DIFFERENTIAL METHOD BLD: ABNORMAL
EOSINOPHIL # BLD AUTO: 0.1 10E9/L (ref 0–0.7)
EOSINOPHIL NFR BLD AUTO: 1.2 %
ERYTHROCYTE [DISTWIDTH] IN BLOOD BY AUTOMATED COUNT: 16.1 % (ref 10–15)
ERYTHROCYTE [SEDIMENTATION RATE] IN BLOOD BY WESTERGREN METHOD: 48 MM/H (ref 0–30)
GFR SERPL CREATININE-BSD FRML MDRD: 71 ML/MIN/{1.73_M2}
HCT VFR BLD AUTO: 37.5 % (ref 35–47)
HGB BLD-MCNC: 12.4 G/DL (ref 11.7–15.7)
LYMPHOCYTES # BLD AUTO: 1.5 10E9/L (ref 0.8–5.3)
LYMPHOCYTES NFR BLD AUTO: 24.7 %
MCH RBC QN AUTO: 28.8 PG (ref 26.5–33)
MCHC RBC AUTO-ENTMCNC: 33.1 G/DL (ref 31.5–36.5)
MCV RBC AUTO: 87 FL (ref 78–100)
MONOCYTES # BLD AUTO: 0.5 10E9/L (ref 0–1.3)
MONOCYTES NFR BLD AUTO: 8.8 %
NEUTROPHILS # BLD AUTO: 3.8 10E9/L (ref 1.6–8.3)
NEUTROPHILS NFR BLD AUTO: 65 %
PLATELET # BLD AUTO: 251 10E9/L (ref 150–450)
PROT SERPL-MCNC: 7.9 G/DL (ref 6.8–8.8)
RBC # BLD AUTO: 4.31 10E12/L (ref 3.8–5.2)
WBC # BLD AUTO: 5.9 10E9/L (ref 4–11)

## 2019-10-10 PROCEDURE — 86140 C-REACTIVE PROTEIN: CPT | Performed by: INTERNAL MEDICINE

## 2019-10-10 PROCEDURE — 86704 HEP B CORE ANTIBODY TOTAL: CPT | Performed by: INTERNAL MEDICINE

## 2019-10-10 PROCEDURE — 36415 COLL VENOUS BLD VENIPUNCTURE: CPT | Performed by: INTERNAL MEDICINE

## 2019-10-10 PROCEDURE — 82565 ASSAY OF CREATININE: CPT | Performed by: INTERNAL MEDICINE

## 2019-10-10 PROCEDURE — 99213 OFFICE O/P EST LOW 20 MIN: CPT | Performed by: INTERNAL MEDICINE

## 2019-10-10 PROCEDURE — 80076 HEPATIC FUNCTION PANEL: CPT | Performed by: INTERNAL MEDICINE

## 2019-10-10 PROCEDURE — 85025 COMPLETE CBC W/AUTO DIFF WBC: CPT | Performed by: INTERNAL MEDICINE

## 2019-10-10 PROCEDURE — 85652 RBC SED RATE AUTOMATED: CPT | Performed by: INTERNAL MEDICINE

## 2019-10-10 RX ORDER — METHOTREXATE 2.5 MG/1
15 TABLET ORAL WEEKLY
Qty: 72 TABLET | Refills: 1 | Status: SHIPPED | OUTPATIENT
Start: 2019-10-10 | End: 2020-02-06

## 2019-10-10 ASSESSMENT — MIFFLIN-ST. JEOR: SCORE: 1024.94

## 2019-10-10 NOTE — PATIENT INSTRUCTIONS
Rheumatology    Dr. Phill Butt [facility]   (Monday)  [address]  [place] [address]       [facility]   (Tuesday)  [address] [place] [address]    [facility]   (Wed., Thurs., and Friday)  [address] [place] [address]    Phone number: [phone]  Thank you for choosing [facility].  JOAQUIN Giron RMA      *Have labs done in 3 months

## 2019-10-10 NOTE — LETTER
[address] [place] [address]    October 11, 2019    Nelda Palma  [address] [place] [address]          Dear Nelda,    Your labs do not show evidence of medication toxicity.  The inflammatory marker ESR was elevated based on the laboratory reference range, but considered normal for age.  The inflammatory marker CRP was normal.  No change to the treatment plan based on these findings.     Please let me know if you have any questions.     Enclosed is a copy of your results.     Results for orders placed or performed in visit on 10/10/19   Hepatitis B core antibody   Result Value Ref Range    Hepatitis B Core Rox Nonreactive NR^Nonreactive   CBC with platelets differential   Result Value Ref Range    WBC 5.9 4.0 - 11.0 10e9/L    RBC Count 4.31 3.8 - 5.2 10e12/L    Hemoglobin 12.4 11.7 - 15.7 g/dL    Hematocrit 37.5 35.0 - 47.0 %    MCV 87 78 - 100 fl    MCH 28.8 26.5 - 33.0 pg    MCHC 33.1 31.5 - 36.5 g/dL    RDW 16.1 (H) 10.0 - 15.0 %    Platelet Count 251 150 - 450 10e9/L    % Neutrophils 65.0 %    % Lymphocytes 24.7 %    % Monocytes 8.8 %    % Eosinophils 1.2 %    % Basophils 0.3 %    Absolute Neutrophil 3.8 1.6 - 8.3 10e9/L    Absolute Lymphocytes 1.5 0.8 - 5.3 10e9/L    Absolute Monocytes 0.5 0.0 - 1.3 10e9/L    Absolute Eosinophils 0.1 0.0 - 0.7 10e9/L    Absolute Basophils 0.0 0.0 - 0.2 10e9/L    Diff Method Automated Method    Creatinine   Result Value Ref Range    Creatinine 0.78 0.52 - 1.04 mg/dL    GFR Estimate 71 >60 mL/min/[1.73_m2]    GFR Estimate If Black 83 >60 mL/min/[1.73_m2]   Erythrocyte sedimentation rate auto   Result Value Ref Range    Sed Rate 48 (H) 0 - 30 mm/h   CRP inflammation   Result Value Ref Range    CRP Inflammation <2.9 0.0 - 8.0 mg/L   Hepatic panel   Result Value Ref Range    Bilirubin Direct 0.1 0.0 - 0.2 mg/dL    Bilirubin Total 0.4 0.2 - 1.3 mg/dL    Albumin 4.0 3.4 - 5.0 g/dL     Protein Total 7.9 6.8 - 8.8 g/dL    Alkaline Phosphatase 40 40 - 150 U/L    ALT 21 0 - 50 U/L    AST 24 0 - 45 U/L       If you have any questions or concerns, please call myself or my nurse at [phone].      Sincerely,        Phill Villa MD/kassie

## 2019-10-10 NOTE — NURSING NOTE
RAPID3 (0-30) Cumulative Score  0.5          RAPID3 Weighted Score (divide #4 by 3 and that is the weighted score)  0.16

## 2019-10-10 NOTE — PROGRESS NOTES
Rheumatology Clinic Visit      Nelda Palma MRN# [id number]   YOB: 1940 Age: 79 year old      Date of visit: 10/10/19   PCP: Dr. Shawn Bradley at the [facility]  Endocrinologist: Dr. Aditya De Leon at [facility].     Chief Complaint   Patient presents with:  RECHECK: RA    Assessment and Plan     1.  Seropositive erosive rheumatoid arthritis (, CCP >2776.8): Reportedly diagnosed in 2004.  Reportedly on hydroxychloroquine in the past that was effective and slowly tapered off.  Note that the patient reports her ophthalmologist telling her that Plaquenil can be very toxic to her eyes and the patient is under the impression that she is not supposed to restart Plaquenil.  Ophthalmology records available for review did not document a contraindication to using Plaquenil.  Regardless, she has erosive disease so methotrexate was started and she is doing great.  No change to the medications.   - Continue methotrexate 15mg (6 tablets) once weekly  - Continue folic acid 1mg daily  - Labs today: CBC, Cr, Hepatic Panel, ESR, CRP, Hepatitis B Core ab  - Labs in 3 months: CBC, Creatinine, Hepatic Panel, ESR, CRP (printed to be done at [facility])    2. Neck pain in the setting of RA: reportedly a chronic issue for years.  No instability or degenerative change seen on 2/14/2019 x-ray.  Significant improvement with physical therapy exercises.    3. Bilateral knee pain: mild.  Crepitation on exam.  By history it is suggestive of patellofemoral syndrome.  Significant improvement with physical therapy exercises    4. Bilateral plantar fasciitis: reviewed the dx and tx options. shoewear reviewed.  Stretching exercises reviewed.  She is also planning to see a podiatrist.      5. Osteoporosis: Managed by her endocrinologist, Dr. Aditya De Leon at [facility].     Ms. Palma verbalized agreement with and understanding of the rational for the diagnosis and treatment plan.  All questions were answered to best of my  ability and the patient's satisfaction. Ms. Palma was advised to contact the clinic with any questions that may arise after the clinic visit.      Thank you for involving me in the care of the patient    Return to clinic: 3-4 months      HPI   Nelda Palma is a 79 year old female with a past medical history significant for hypertension, left TKA, migraines, rosacea, bilateral cataract surgery in 2009, hypothyroidism, senile osteoporosis, and rheumatoid arthritis who is seen for initial rheumatology evaluation in this clinic.      Today, she reports doing great.   No morning stiffness.  Tolerating methotrexate.  Neck pain significantly improved with physical therapy she exercises that she continues to do at home.  Knee pain also improved with physical therapy exercises that she continues to do at home.  No other joint pain.    Denies fevers, chills, nausea, vomiting, constipation, diarrhea. No abdominal pain. No chest pain/pressure, palpitations, or shortness of breath. No LE swelling. No neck pain. No oral or nasal sores.  No rash. No sicca symptoms.     Tobacco: none  EtOH: none  Drugs: none  Occupation: [employer], now retired    ROS   GEN: No fevers, chills, night sweats, or weight change  SKIN: No itching, rashes, sores  HEENT: No epistaxis. No oral or nasal ulcers.  CV: No chest pain, pressure, palpitations, or dyspnea on exertion.  PULM: No SOB, wheeze, cough.  GI: No nausea, vomiting, constipation, diarrhea. No blood in stool. No abdominal pain.  : No blood in urine.  MSK: See HPI.  NEURO: No numbness or tingling  ENDO: No heat/cold intolerance.  EXT: No LE swelling  PSYCH: Negative    Active Problem List     Patient Active Problem List   Diagnosis     Actinic keratosis     Essential hypertension     GA (granuloma annulare)     Headache     Hypothyroidism     Meningioma (H)     Rheumatoid arthritis involving both hands with positive rheumatoid factor (H)     Rosacea     Senile  osteoporosis     Sensorineural hearing loss, bilateral     Neck pain     Past Medical History   See above    Past Surgical History   Left TKA    Allergy     Allergies   Allergen Reactions     Oxycodone-Acetaminophen Anxiety     After TKA she developed marked post operative anxiety felt to be due to the Percocet; took it again after going home and symptoms recurred.     Penicillins Rash     Current Medication List     Current Outpatient Medications   Medication Sig     folic acid (FOLVITE) 1 MG tablet Take 1 tablet (1 mg) by mouth daily     hydrochlorothiazide (HYDRODIURIL) 25 MG tablet Take 25 mg by mouth daily     levothyroxine (SYNTHROID/LEVOTHROID) 88 MCG tablet Take 88 mcg by mouth daily     losartan (COZAAR) 50 MG tablet Take 75 mg by mouth daily     magnesium 250 MG tablet Take 1 tablet by mouth daily     methotrexate sodium 2.5 MG TABS Take 6 tablets (15 mg) by mouth once a week . Take all 6 tablets on the same day of each week.     Vitamin D, Cholecalciferol, 400 units TABS      diclofenac (VOLTAREN) 1 % topical gel Apply up to 4 times per day to the hands as needed; no more than 2 grams per application to the upper extremities. (Patient not taking: Reported on 10/10/2019)     diclofenac (VOLTAREN) 75 MG EC tablet Take 75 mg by mouth 2 times daily     No current facility-administered medications for this visit.      Social History   See HPI    Family History     Family History   Problem Relation Age of Onset     Diabetes Mother      Diabetes Father        Physical Exam     Temp Readings from Last 3 Encounters:   06/06/19 97.9  F (36.6  C) (Oral)   02/14/19 96.3  F (35.7  C) (Oral)     BP Readings from Last 5 Encounters:   10/10/19 116/80   06/06/19 122/77   02/14/19 162/80     Pulse Readings from Last 1 Encounters:   10/10/19 57     Resp Readings from Last 1 Encounters:   No data found for Resp     Estimated body mass index is 23.76 kg/m  as calculated from the following:    Height as of this encounter:  "1.588 m (5' 2.5\").    Weight as of this encounter: 59.9 kg (132 lb).    GEN: NAD  HEENT: MMM. No oral lesions. Anicteric, noninjected sclera  CV: S1, S2. RRR. No m/r/g.  PULM: CTA bilaterally. No w/c.  ABD: +BS.   MSK: Mild synovial swelling without tenderness palpation of the bilateral second and third MCPs.  Other MCPs and PIPs without swelling or tenderness palpation.  Bony hypertrophy of the PIPs and DIPs that were not tender to palpation.  Wrists without swelling or tenderness palpation.  Elbows without swelling or tenderness palpation.  Shoulders without swelling or tenderness to palpation; normal range of motion.  Neck range of motion did not cause pain.  Tender to palpation over the right upper trapezius. Knees with mild medial joint line tenderness and crepitation but no effusion or increased warmth.  Ankles and MTPs without swelling or tenderness to palpation.   NEURO: UE and LE strengths 5/5 and equal bilaterally.   SKIN: No rash.  No nail pitting.  EXT: No LE edema  PSYCH: Alert. Appropriate.    Labs / Imaging (select studies)     CMP  Recent Labs   Lab Test 07/15/19 05/15/19 03/20/19   CR 0.79 0.83 0.82   GFRESTIMATED >60 >60 >60   GFRESTBLACK >60 >60 >60   AST 20 20 22   ALT 9 9 11       Immunization History     There is no immunization history on file for this patient.       Chart documentation done in part with Dragon Voice recognition Software. Although reviewed after completion, some word and grammatical error may remain.    Phill Villa MD      "

## 2019-10-11 LAB
CRP SERPL-MCNC: <2.9 MG/L (ref 0–8)
HBV CORE AB SERPL QL IA: NONREACTIVE

## 2019-10-11 NOTE — RESULT ENCOUNTER NOTE
"Please mail Ms. Palma her results with the following message.    \"Ms. Palma,    Your labs do not show evidence of medication toxicity.  The inflammatory marker ESR was elevated based on the laboratory reference range, but considered normal for age.  The inflammatory marker CRP was normal.  No change to the treatment plan based on these findings.    Please let me know if you have any questions.    Sincerely,  Phill Villa MD  10/11/2019 2:11 PM\"  "

## 2020-01-15 ENCOUNTER — TRANSFERRED RECORDS (OUTPATIENT)
Dept: HEALTH INFORMATION MANAGEMENT | Facility: CLINIC | Age: 80
End: 2020-01-15

## 2020-02-06 ENCOUNTER — OFFICE VISIT (OUTPATIENT)
Dept: RHEUMATOLOGY | Facility: CLINIC | Age: 80
End: 2020-02-06
Payer: COMMERCIAL

## 2020-02-06 VITALS
HEART RATE: 70 BPM | SYSTOLIC BLOOD PRESSURE: 132 MMHG | HEIGHT: 63 IN | BODY MASS INDEX: 23.92 KG/M2 | DIASTOLIC BLOOD PRESSURE: 89 MMHG | WEIGHT: 135 LBS | OXYGEN SATURATION: 97 %

## 2020-02-06 DIAGNOSIS — M05.79 RHEUMATOID ARTHRITIS INVOLVING MULTIPLE SITES WITH POSITIVE RHEUMATOID FACTOR (H): ICD-10-CM

## 2020-02-06 PROCEDURE — 99213 OFFICE O/P EST LOW 20 MIN: CPT | Performed by: INTERNAL MEDICINE

## 2020-02-06 RX ORDER — FOLIC ACID 1 MG/1
1 TABLET ORAL DAILY
Qty: 100 TABLET | Refills: 3 | Status: SHIPPED | OUTPATIENT
Start: 2020-02-06 | End: 2021-01-13

## 2020-02-06 RX ORDER — METHOTREXATE 2.5 MG/1
17.5 TABLET ORAL WEEKLY
Qty: 84 TABLET | Refills: 1 | Status: SHIPPED | OUTPATIENT
Start: 2020-02-06 | End: 2020-09-03

## 2020-02-06 ASSESSMENT — MIFFLIN-ST. JEOR: SCORE: 1038.55

## 2020-02-06 NOTE — PROGRESS NOTES
Rheumatology Clinic Visit      Nelda Palma MRN# [id number]   YOB: 1940 Age: 79 year old      Date of visit: 2/06/20   PCP: Dr. Shawn Bradley at the [facility]  Endocrinologist: Dr. Aditya De Leon at [facility].     Chief Complaint   Patient presents with:  Arthritis: Rheumatoid arthritis    Assessment and Plan     1.  Seropositive erosive rheumatoid arthritis (, CCP >2776.8): Reportedly diagnosed in 2004.  Reportedly on hydroxychloroquine in the past that was effective and slowly tapered off.  Note that the patient reports her ophthalmologist telling her that Plaquenil can be very toxic to her eyes and the patient is under the impression that she is not supposed to restart Plaquenil.  Ophthalmology records available for review did not document a contraindication to using Plaquenil.  Regardless, she has erosive disease so methotrexate was started 2/15/2019 with significant improvement.  Today, 2/6/2020: Mild synovitis across the MCPs so will increase methotrexate.  - Increase methotrexate from 15 mg once weekly to 17.5 mg once weekly   - Continue folic acid 1mg daily  - Labs monthly [id number]: CBC, Cr, Hepatic Panel  - Labs in 3 months: CBC, Creatinine, Hepatic Panel, ESR, CRP    2. Neck pain in the setting of RA: reportedly a chronic issue for years.  No instability or degenerative change seen on 2/14/2019 x-ray.  Significant improvement with physical therapy exercises.    3. Bilateral knee pain: mild.  Crepitation on exam.  By history it is suggestive of patellofemoral syndrome.  Significant improvement with physical therapy exercises    4. Bilateral plantar fasciitis: reviewed the dx and tx options. shoewear reviewed.  Stretching exercises reviewed.  She is also planning to see a podiatrist.      5. Osteoporosis: Managed by her endocrinologist, Dr. Aditya De Leon at [facility].     Ms. Palma verbalized agreement with and understanding of the rational for the diagnosis and treatment  plan.  All questions were answered to best of my ability and the patient's satisfaction. Ms. Palma was advised to contact the clinic with any questions that may arise after the clinic visit.      Thank you for involving me in the care of the patient    Return to clinic: 3-4 months      HPI   Nelda Palma is a 79 year old female with a past medical history significant for hypertension, left TKA, migraines, rosacea, bilateral cataract surgery in 2009, hypothyroidism, senile osteoporosis, and rheumatoid arthritis who is seen for initial rheumatology evaluation in this clinic.      Today, she reports a recent fall that strained her neck but she is already improving.  Rheumatoid arthritis has been doing fairly well but some pain across her MCPs that is worse in the morning and improves with time and activity; mild.  No swelling.     Denies fevers, chills, nausea, vomiting, constipation, diarrhea. No abdominal pain. No chest pain/pressure, palpitations, or shortness of breath. No LE swelling. No oral or nasal sores.  No rash. No sicca symptoms.     Tobacco: none  EtOH: none  Drugs: none  Occupation: [employer], now retired    ROS   GEN: No fevers, chills, night sweats, or weight change  SKIN: No itching, rashes, sores  HEENT: No epistaxis. No oral or nasal ulcers.  CV: No chest pain, pressure, palpitations, or dyspnea on exertion.  PULM: No SOB, wheeze, cough.  GI: No nausea, vomiting, constipation, diarrhea. No blood in stool. No abdominal pain.  : No blood in urine.  MSK: See HPI.  NEURO: No numbness or tingling  ENDO: No heat/cold intolerance.  EXT: No LE swelling  PSYCH: Negative    Active Problem List     Patient Active Problem List   Diagnosis     Actinic keratosis     Essential hypertension     GA (granuloma annulare)     Headache     Hypothyroidism     Meningioma (H)     Rheumatoid arthritis involving both hands with positive rheumatoid factor (H)     Rosacea     Senile osteoporosis      Sensorineural hearing loss, bilateral     Neck pain     Past Medical History   See above    Past Surgical History   Left TKA    Allergy     Allergies   Allergen Reactions     Oxycodone-Acetaminophen Anxiety     After TKA she developed marked post operative anxiety felt to be due to the Percocet; took it again after going home and symptoms recurred.     Penicillins Rash     Current Medication List     Current Outpatient Medications   Medication Sig     folic acid (FOLVITE) 1 MG tablet Take 1 tablet (1 mg) by mouth daily     hydrochlorothiazide (HYDRODIURIL) 25 MG tablet Take 25 mg by mouth daily     levothyroxine (SYNTHROID/LEVOTHROID) 88 MCG tablet Take 88 mcg by mouth daily     losartan (COZAAR) 50 MG tablet Take 75 mg by mouth daily     magnesium 250 MG tablet Take 1 tablet by mouth daily     methotrexate sodium 2.5 MG TABS Take 7 tablets (17.5 mg) by mouth once a week . Take all 7 tablets on the same day of each week.     Vitamin D, Cholecalciferol, 400 units TABS      diclofenac (VOLTAREN) 1 % topical gel Apply up to 4 times per day to the hands as needed; no more than 2 grams per application to the upper extremities. (Patient not taking: Reported on 10/10/2019)     diclofenac (VOLTAREN) 75 MG EC tablet Take 75 mg by mouth 2 times daily     No current facility-administered medications for this visit.      Social History   See HPI    Family History     Family History   Problem Relation Age of Onset     Diabetes Mother      Diabetes Father        Physical Exam     Temp Readings from Last 3 Encounters:   06/06/19 97.9  F (36.6  C) (Oral)   02/14/19 96.3  F (35.7  C) (Oral)     BP Readings from Last 5 Encounters:   02/06/20 132/89   10/10/19 116/80   06/06/19 122/77   02/14/19 162/80     Pulse Readings from Last 1 Encounters:   02/06/20 70     Resp Readings from Last 1 Encounters:   No data found for Resp     Estimated body mass index is 24.3 kg/m  as calculated from the following:    Height as of this encounter:  "1.588 m (5' 2.5\").    Weight as of this encounter: 61.2 kg (135 lb).    GEN: NAD  HEENT: MMM. No oral lesions. Anicteric, noninjected sclera  CV: S1, S2. RRR. No m/r/g.  PULM: CTA bilaterally. No w/c.  MSK: Mild synovial swelling and tenderness to palpation of the bilateral second-fifth MCPs.  PIPs, DIPs, wrists, elbows, shoulders, knees, ankles, and MTPs without swelling or tenderness to palpation.  Hips nontender to palpation.    NEURO: UE and LE strengths 5/5 and equal bilaterally.   SKIN: No rash.    EXT: No LE edema  PSYCH: Alert. Appropriate.    Labs / Imaging (select studies)     1/15/2020 [facility] labs reviewed .    CBC  Recent Labs   Lab Test 10/10/19  1118   WBC 5.9   RBC 4.31   HGB 12.4   HCT 37.5   MCV 87   RDW 16.1*      MCH 28.8   MCHC 33.1   NEUTROPHIL 65.0   LYMPH 24.7   MONOCYTE 8.8   EOSINOPHIL 1.2   BASOPHIL 0.3   ANEU 3.8   ALYM 1.5   NGOC 0.5   AEOS 0.1   ABAS 0.0     CMP  Recent Labs   Lab Test 10/10/19  1118 07/15/19 05/15/19   CR 0.78 0.79 0.83   GFRESTIMATED 71 >60 >60   GFRESTBLACK 83 >60 >60   BILITOTAL 0.4  --   --    ALBUMIN 4.0  --   --    PROTTOTAL 7.9  --   --    ALKPHOS 40  --   --    AST 24 20 20   ALT 21 9 9     ESR/CRP  Recent Labs   Lab Test 10/10/19  1118   SED 48*   CRP <2.9     Hepatitis B  Recent Labs   Lab Test 10/10/19  1118   HBCAB Nonreactive     Immunization History     There is no immunization history on file for this patient.       Chart documentation done in part with Dragon Voice recognition Software. Although reviewed after completion, some word and grammatical error may remain.    Phill Villa MD      "

## 2020-02-06 NOTE — NURSING NOTE
RAPID3 (0-30) Cumulative Score  1.0          RAPID3 Weighted Score (divide #4 by 3 and that is the weighted score)  0.3         Abi Sauceda [facility] Rheumatology  2/6/2020 12:49 PM

## 2020-02-06 NOTE — PATIENT INSTRUCTIONS
Rheumatology    Dr. Phill Villa       M [facility] in [place]   (Monday)  [address]  [place] [address]       M [facility] in [place]   (Tuesday)  [address] [place] [address]    [facility] in [place]   (Wed., Thurs., and Friday)  [address] [place] [address]    Phone number: [phone]  Thank you for choosing [facility].  Abi Sauceda CMA

## 2020-02-12 ENCOUNTER — TELEPHONE (OUTPATIENT)
Dept: RHEUMATOLOGY | Facility: CLINIC | Age: 80
End: 2020-02-12

## 2020-02-12 NOTE — TELEPHONE ENCOUNTER
Reason for call:  Other   Patient called regarding (reason for call): prescription  Additional comments:  Patient takes methotrexate. She forgot to take it yesterday. Should she take it today? Please call and advise.     Phone number to reach patient:  Home number on file [phone] (home)    Best Time:  Any     Can we leave a detailed message on this number?  YES

## 2020-02-12 NOTE — TELEPHONE ENCOUNTER
I called pt back and let her know she can take her regular dose today, DO NOT double up. Her new days will be wed to take med. She verbalized understanding.    Katie Torres RN Specialty Triage 2/12/2020 8:58 AM

## 2020-03-24 DIAGNOSIS — M05.79 RHEUMATOID ARTHRITIS INVOLVING MULTIPLE SITES WITH POSITIVE RHEUMATOID FACTOR (H): ICD-10-CM

## 2020-03-24 RX ORDER — METHOTREXATE 2.5 MG/1
17.5 TABLET ORAL WEEKLY
Qty: 84 TABLET | Refills: 1 | OUTPATIENT
Start: 2020-03-24

## 2020-03-24 NOTE — TELEPHONE ENCOUNTER
Rheumatology team: Please call Ms. Palma and her pharmacy to check on the refill request for methotrexate.  Based on last refill date and quantity a refill should not yet be needed.    I have refused this refill.  Let me know if there is an issue that needs to be addressed.       Phill Villa MD  3/24/2020 4:15 PM

## 2020-03-24 NOTE — TELEPHONE ENCOUNTER
Routing refill request to provider for review/approval because:  Med not listed on RN refill protocol     Requested Prescriptions   Pending Prescriptions Disp Refills     methotrexate sodium 2.5 MG TABS 84 tablet 1     Sig: Take 7 tablets (17.5 mg) by mouth once a week . Take all 7 tablets on the same day of each week.       There is no refill protocol information for this order        Carolyn Lafleur RN

## 2020-03-26 NOTE — TELEPHONE ENCOUNTER
Spoke with pharmacy, patient has refills and they will be filling for her.  Abi Sauceda CMA Rheumatology  3/26/2020 11:31 AM

## 2020-05-06 NOTE — PROGRESS NOTES
"Nelda Palma is a 79 year old female who is being evaluated via a billable telephone visit.      The patient has been notified of following:     \"This telephone visit will be conducted via a call between you and your physician/provider. We have found that certain health care needs can be provided without the need for a physical exam.  This service lets us provide the care you need with a short phone conversation.  If a prescription is necessary we can send it directly to your pharmacy.  If lab work is needed we can place an order for that and you can then stop by our lab to have the test done at a later time.    Telephone visits are billed at different rates depending on your insurance coverage. During this emergency period, for some insurers they may be billed the same as an in-person visit.  Please reach out to your insurance provider with any questions.    If during the course of the call the physician/provider feels a telephone visit is not appropriate, you will not be charged for this service.\"    Patient has given verbal consent for Telephone visit?  Yes    What phone number would you like to be contacted at? [phone]    How would you like to obtain your AVS? Mail a copy    Rheumatology Telephone/TeleHealth Visit      Nelda Palma MRN# [id number]   YOB: 1940 Age: 79 year old      Date of visit: 5/07/20   PCP: Dr. Shawn Bradley at the [facility]  Endocrinologist: Dr. Aditya De Leon at [facility].     Chief Complaint   Patient presents with:  Arthritis: Patient had no complaints    Assessment and Plan     1.  Seropositive erosive rheumatoid arthritis (, CCP >2776.8): Reportedly diagnosed in 2004.  Reportedly on hydroxychloroquine in the past that was effective and slowly tapered off.  Note that the patient reports her ophthalmologist telling her that Plaquenil can be very toxic to her eyes and the patient is under the impression that she is not supposed to restart " Plaquenil.  Ophthalmology records available for review did not document a contraindication to using Plaquenil.  Regardless, she has erosive disease so methotrexate was started 2/15/2019 with significant improvement.   2/6/2020: Mild synovitis across the MCPs so MTX was increased with resolution of the inflammatory symptoms.  Labs are overdue; she was given the contact information for the lab on [address] so that she can schedule there.   - Continue methotrexate 17.5 mg once weekly   - Continue folic acid 1mg daily  - Labs ASAP and in 3 months: CBC, Creatinine, Hepatic Panel, ESR, CRP    2. Neck pain in the setting of RA: reportedly a chronic issue for years.  No instability or degenerative change seen on 2/14/2019 x-ray.  Significant improvement with physical therapy exercises.    3. Bilateral knee pain: mild.  Crepitation on exam.  By history it is suggestive of patellofemoral syndrome.  Significant improvement with physical therapy exercises    4. Bilateral plantar fasciitis: reviewed the dx and tx options. shoewear reviewed.  Stretching exercises reviewed.  She is also planning to see a podiatrist.      5. Osteoporosis: Managed by her endocrinologist, Dr. Aditya De Leon at [facility].     # Relevant labs and imaging were reviewed with the patient    # High risk medication toxicity monitoring: discussion and labs reviewed; appropriate labs ordered. See above.  Instructed that if confirmed to have COVID-19 or exposure to someone with confirmed COVID-19 to call this clinic for directions on DMARD management.    # Note that this is a virtual visit to reduce the risk of COVID-19 exposure during this current pandemic.      Ms. Palma verbalized agreement with and understanding of the rational for the diagnosis and treatment plan.  All questions were answered to best of my ability and the patient's satisfaction. Ms. Palma was advised to contact the clinic with any questions that may arise after the clinic visit.      Thank you  for involving me in the care of the patient    Return to clinic: 3-4 months      HPI   Nelda Palma is a 79 year old female with a past medical history significant for hypertension, left TKA, migraines, rosacea, bilateral cataract surgery in 2009, hypothyroidism, senile osteoporosis, and rheumatoid arthritis who is seen for initial rheumatology evaluation in this clinic.      Today, she reports doing great.  Tolerating MTX well. No joint pain or swelling.  MCP swelling has resolved; no ache there either. Morning stiffness for <10 min.     Denies fevers, chills, nausea, vomiting, constipation, diarrhea. No abdominal pain. No chest pain/pressure, palpitations, or shortness of breath. No LE swelling. No oral or nasal sores.  No rash. No sicca symptoms.     Tobacco: none  EtOH: none  Drugs: none  Occupation: [employer], now retired    BCKSTGR   GEN: No fevers, chills, night sweats, or weight change  SKIN: No itching, rashes, sores  HEENT: No epistaxis. No oral or nasal ulcers.  CV: No chest pain, pressure, palpitations, or dyspnea on exertion.  PULM: No SOB, wheeze, cough.  GI: No nausea, vomiting, constipation, diarrhea. No blood in stool. No abdominal pain.  : No blood in urine.  MSK: See HPI.  NEURO: No numbness or tingling  ENDO: No heat/cold intolerance.  EXT: No LE swelling  PSYCH: Negative    Active Problem List     Patient Active Problem List   Diagnosis     Actinic keratosis     Essential hypertension     GA (granuloma annulare)     Headache     Hypothyroidism     Meningioma (H)     Rheumatoid arthritis involving both hands with positive rheumatoid factor (H)     Rosacea     Senile osteoporosis     Sensorineural hearing loss, bilateral     Neck pain     Past Medical History   See above    Past Surgical History   Left TKA    Allergy     Allergies   Allergen Reactions     Oxycodone-Acetaminophen Anxiety     After TKA she developed marked post operative anxiety felt to be due to the Percocet; took  "it again after going home and symptoms recurred.     Penicillins Rash     Current Medication List     Current Outpatient Medications   Medication Sig     diclofenac (VOLTAREN) 75 MG EC tablet Take 75 mg by mouth 2 times daily     folic acid (FOLVITE) 1 MG tablet Take 1 tablet (1 mg) by mouth daily     hydrochlorothiazide (HYDRODIURIL) 25 MG tablet Take 25 mg by mouth daily     levothyroxine (SYNTHROID/LEVOTHROID) 88 MCG tablet Take 88 mcg by mouth daily     losartan (COZAAR) 50 MG tablet Take 75 mg by mouth daily     magnesium 250 MG tablet Take 1 tablet by mouth daily     methotrexate sodium 2.5 MG TABS Take 7 tablets (17.5 mg) by mouth once a week . Take all 7 tablets on the same day of each week.     Vitamin D, Cholecalciferol, 400 units TABS      diclofenac (VOLTAREN) 1 % topical gel Apply up to 4 times per day to the hands as needed; no more than 2 grams per application to the upper extremities. (Patient not taking: Reported on 10/10/2019)     No current facility-administered medications for this visit.      Social History   See HPI    Family History     Family History   Problem Relation Age of Onset     Diabetes Mother      Diabetes Father        Physical Exam     Temp Readings from Last 3 Encounters:   06/06/19 97.9  F (36.6  C) (Oral)   02/14/19 96.3  F (35.7  C) (Oral)     BP Readings from Last 5 Encounters:   02/06/20 132/89   10/10/19 116/80   06/06/19 122/77   02/14/19 162/80     Pulse Readings from Last 1 Encounters:   02/06/20 70     Resp Readings from Last 1 Encounters:   No data found for Resp     Estimated body mass index is 24.3 kg/m  as calculated from the following:    Height as of 2/6/20: 1.588 m (5' 2.5\").    Weight as of 2/6/20: 61.2 kg (135 lb).    Telephone visit    Labs / Imaging (select studies)     1/15/2020 [facility] labs reviewed .    CBC  Recent Labs   Lab Test 10/10/19  1118   WBC 5.9   RBC 4.31   HGB 12.4   HCT 37.5   MCV 87   RDW 16.1*      MCH 28.8   MCHC 33.1   NEUTROPHIL 65.0 "   LYMPH 24.7   MONOCYTE 8.8   EOSINOPHIL 1.2   BASOPHIL 0.3   ANEU 3.8   ALYM 1.5   NGOC 0.5   AEOS 0.1   ABAS 0.0     CMP  Recent Labs   Lab Test 10/10/19  1118 07/15/19 05/15/19   CR 0.78 0.79 0.83   GFRESTIMATED 71 >60 >60   GFRESTBLACK 83 >60 >60   BILITOTAL 0.4  --   --    ALBUMIN 4.0  --   --    PROTTOTAL 7.9  --   --    ALKPHOS 40  --   --    AST 24 20 20   ALT 21 9 9     ESR/CRP  Recent Labs   Lab Test 10/10/19  1118   SED 48*   CRP <2.9     Hepatitis B  Recent Labs   Lab Test 10/10/19  1118   HBCAB Nonreactive       Immunization History     There is no immunization history on file for this patient.       Chart documentation done in part with Dragon Voice recognition Software. Although reviewed after completion, some word and grammatical error may remain.    Phone call start time: 1:40 PM  Phone call end time: 1:50 PM    This visit is equivalent to a 50774 visit    Location of patient: home  Location of provider: home    Follow up:  follow up appointment scheduled to be in Sept    Phill Villa MD  5/7/2020

## 2020-05-07 ENCOUNTER — VIRTUAL VISIT (OUTPATIENT)
Dept: RHEUMATOLOGY | Facility: CLINIC | Age: 80
End: 2020-05-07
Payer: COMMERCIAL

## 2020-05-07 DIAGNOSIS — Z79.899 HIGH RISK MEDICATION USE: ICD-10-CM

## 2020-05-07 DIAGNOSIS — M05.79 RHEUMATOID ARTHRITIS INVOLVING MULTIPLE SITES WITH POSITIVE RHEUMATOID FACTOR (H): Primary | ICD-10-CM

## 2020-05-07 PROCEDURE — 99213 OFFICE O/P EST LOW 20 MIN: CPT | Mod: 95 | Performed by: INTERNAL MEDICINE

## 2020-05-21 DIAGNOSIS — Z79.899 HIGH RISK MEDICATION USE: ICD-10-CM

## 2020-05-21 DIAGNOSIS — M05.79 RHEUMATOID ARTHRITIS INVOLVING MULTIPLE SITES WITH POSITIVE RHEUMATOID FACTOR (H): ICD-10-CM

## 2020-05-21 LAB
ALBUMIN SERPL-MCNC: 3.6 G/DL (ref 3.4–5)
ALP SERPL-CCNC: 37 U/L (ref 40–150)
ALT SERPL W P-5'-P-CCNC: 22 U/L (ref 0–50)
AST SERPL W P-5'-P-CCNC: 19 U/L (ref 0–45)
BASOPHILS # BLD AUTO: 0 10E9/L (ref 0–0.2)
BASOPHILS NFR BLD AUTO: 0.5 %
BILIRUB DIRECT SERPL-MCNC: 0.1 MG/DL (ref 0–0.2)
BILIRUB SERPL-MCNC: 0.4 MG/DL (ref 0.2–1.3)
CREAT SERPL-MCNC: 0.83 MG/DL (ref 0.52–1.04)
CRP SERPL-MCNC: <2.9 MG/L (ref 0–8)
DIFFERENTIAL METHOD BLD: NORMAL
EOSINOPHIL # BLD AUTO: 0.1 10E9/L (ref 0–0.7)
EOSINOPHIL NFR BLD AUTO: 1.3 %
ERYTHROCYTE [DISTWIDTH] IN BLOOD BY AUTOMATED COUNT: 15 % (ref 10–15)
ERYTHROCYTE [SEDIMENTATION RATE] IN BLOOD BY WESTERGREN METHOD: 60 MM/H (ref 0–30)
GFR SERPL CREATININE-BSD FRML MDRD: 66 ML/MIN/{1.73_M2}
HCT VFR BLD AUTO: 38.3 % (ref 35–47)
HGB BLD-MCNC: 12.4 G/DL (ref 11.7–15.7)
IMM GRANULOCYTES # BLD: 0 10E9/L (ref 0–0.4)
IMM GRANULOCYTES NFR BLD: 0.5 %
LYMPHOCYTES # BLD AUTO: 1.6 10E9/L (ref 0.8–5.3)
LYMPHOCYTES NFR BLD AUTO: 26.7 %
MCH RBC QN AUTO: 28.8 PG (ref 26.5–33)
MCHC RBC AUTO-ENTMCNC: 32.4 G/DL (ref 31.5–36.5)
MCV RBC AUTO: 89 FL (ref 78–100)
MONOCYTES # BLD AUTO: 0.7 10E9/L (ref 0–1.3)
MONOCYTES NFR BLD AUTO: 12.1 %
NEUTROPHILS # BLD AUTO: 3.6 10E9/L (ref 1.6–8.3)
NEUTROPHILS NFR BLD AUTO: 58.9 %
NRBC # BLD AUTO: 0 10*3/UL
NRBC BLD AUTO-RTO: 0 /100
PLATELET # BLD AUTO: 289 10E9/L (ref 150–450)
PROT SERPL-MCNC: 7.4 G/DL (ref 6.8–8.8)
RBC # BLD AUTO: 4.31 10E12/L (ref 3.8–5.2)
WBC # BLD AUTO: 6.1 10E9/L (ref 4–11)

## 2020-05-21 PROCEDURE — 85652 RBC SED RATE AUTOMATED: CPT | Performed by: INTERNAL MEDICINE

## 2020-05-21 PROCEDURE — 36415 COLL VENOUS BLD VENIPUNCTURE: CPT | Performed by: INTERNAL MEDICINE

## 2020-05-21 PROCEDURE — 86140 C-REACTIVE PROTEIN: CPT | Performed by: INTERNAL MEDICINE

## 2020-05-21 PROCEDURE — 82565 ASSAY OF CREATININE: CPT | Performed by: INTERNAL MEDICINE

## 2020-05-21 PROCEDURE — 80076 HEPATIC FUNCTION PANEL: CPT | Performed by: INTERNAL MEDICINE

## 2020-05-21 PROCEDURE — 85025 COMPLETE CBC W/AUTO DIFF WBC: CPT | Performed by: INTERNAL MEDICINE

## 2020-05-21 NOTE — NURSING NOTE
Chief Complaint   Patient presents with     Blood Draw     Labs drawn via VPT by RN in lab.      Labs collected from venipuncture by RN.    Bridget GRAMAJO RN PHN BSN  BMT/Oncology Lab

## 2020-08-31 DIAGNOSIS — M05.79 RHEUMATOID ARTHRITIS INVOLVING MULTIPLE SITES WITH POSITIVE RHEUMATOID FACTOR (H): ICD-10-CM

## 2020-08-31 NOTE — TELEPHONE ENCOUNTER
Medication:   Methotrexate  Last written on:   2/6/2020  Quantity:   84    Refills:   1    Last office visit:   5/7/2020  Next office visit:   9/10/2020  Last labs:   5/21/2020    Abi Sauceda CMA Rheumatology  8/31/2020 10:11 AM

## 2020-09-03 RX ORDER — METHOTREXATE 2.5 MG/1
17.5 TABLET ORAL WEEKLY
Qty: 84 TABLET | Refills: 0 | Status: SHIPPED | OUTPATIENT
Start: 2020-09-03 | End: 2021-01-13

## 2020-09-03 NOTE — TELEPHONE ENCOUNTER
Rheumatology team: Please call to notify Ms. Palma that methotrexate has been refilled.  Phill Villa MD  9/3/2020 5:25 PM

## 2020-09-10 ENCOUNTER — VIRTUAL VISIT (OUTPATIENT)
Dept: RHEUMATOLOGY | Facility: CLINIC | Age: 80
End: 2020-09-10
Payer: COMMERCIAL

## 2020-09-10 DIAGNOSIS — M05.79 RHEUMATOID ARTHRITIS INVOLVING MULTIPLE SITES WITH POSITIVE RHEUMATOID FACTOR (H): Primary | ICD-10-CM

## 2020-09-10 DIAGNOSIS — Z79.899 HIGH RISK MEDICATION USE: ICD-10-CM

## 2020-09-10 DIAGNOSIS — Z11.59 ENCOUNTER FOR SCREENING FOR OTHER VIRAL DISEASES: ICD-10-CM

## 2020-09-10 PROCEDURE — 99213 OFFICE O/P EST LOW 20 MIN: CPT | Mod: 95 | Performed by: INTERNAL MEDICINE

## 2020-09-10 NOTE — PROGRESS NOTES
"Nelda Palma is a 80 year old female who is being evaluated via a billable telephone visit.      The patient has been notified of following:     \"This telephone visit will be conducted via a call between you and your physician/provider. We have found that certain health care needs can be provided without the need for a physical exam.  This service lets us provide the care you need with a short phone conversation.  If a prescription is necessary we can send it directly to your pharmacy.  If lab work is needed we can place an order for that and you can then stop by our lab to have the test done at a later time.    Telephone visits are billed at different rates depending on your insurance coverage. During this emergency period, for some insurers they may be billed the same as an in-person visit.  Please reach out to your insurance provider with any questions.    If during the course of the call the physician/provider feels a telephone visit is not appropriate, you will not be charged for this service.\"    Patient has given verbal consent for Telephone visit?  Yes    What phone number would you like to be contacted at? [phone]    How would you like to obtain your AVS? Mail a copy      Rheumatology Telephone/TeleHealth Visit      Nelda Palma MRN# [id number]   YOB: 1940 Age: 80 year old      Date of visit: 9/10/20   PCP: Dr. Shawn Bradley at the [facility]  Endocrinologist: Dr. Aditya De Leon at [facility].     Chief Complaint   Patient presents with:  Arthritis: RA. Doing good    Assessment and Plan     1.  Seropositive erosive rheumatoid arthritis (, CCP >2776.8): Reportedly diagnosed in 2004.  Reportedly on hydroxychloroquine in the past that was effective and slowly tapered off.  Note that the patient reports her ophthalmologist telling her that Plaquenil can be very toxic to her eyes and the patient is under the impression that she is not supposed to restart Plaquenil.  " Ophthalmology records available for review did not document a contraindication to using Plaquenil.  Regardless, she has erosive disease so methotrexate was started 2/15/2019 with significant improvement.   2/6/2020: Mild synovitis across the MCPs so MTX was increased with resolution of the inflammatory symptoms.  Doing well at this time. Labs are overdue; advised that she call to schedule a lab appointment for this week and in 3 months.   - Continue methotrexate 17.5 mg once weekly   - Continue folic acid 1mg daily  - Labs within 1 week: CBC, Creatinine, Hepatic Panel, ESR, CRP, hepatitis B core antibody and surface antigen  - Labs in 3 months: CBC, Creatinine, Hepatic Panel, ESR, CRP    2. Neck pain in the setting of RA: reportedly a chronic issue for years.  No instability or degenerative change seen on 2/14/2019 x-ray.  Significant improvement with physical therapy exercises.    3. Bilateral knee pain: mild.  Crepitation on exam.  By history it is suggestive of patellofemoral syndrome.  Significant improvement with physical therapy exercises    4. Bilateral plantar fasciitis: reviewed the dx and tx options. shoewear reviewed.  Stretching exercises reviewed.  She is also planning to see a podiatrist.      5. Osteoporosis: Managed by her endocrinologist, Dr. Aditya De Leon at [facility].     # Relevant labs and imaging were reviewed with the patient    # High risk medication toxicity monitoring: discussion and labs reviewed; appropriate labs ordered. See above.  Instructed that if confirmed to have COVID-19 or exposure to someone with confirmed COVID-19 to call this clinic for directions on DMARD management.    # Note that this is a virtual visit to reduce the risk of COVID-19 exposure during this current pandemic.      # Considered to be at high risk of complications from the COVID-19 virus.  It is recommended to limit contact with other people and if possible to work remotely or provide a leave of absence to reduce the  risk for COVID-19.      Ms. Palma verbalized agreement with and understanding of the rational for the diagnosis and treatment plan.  All questions were answered to best of my ability and the patient's satisfaction. Ms. Palma was advised to contact the clinic with any questions that may arise after the clinic visit.      Thank you for involving me in the care of the patient    Return to clinic: 3-4 months      HPI   Nelda Palma is a 80 year old female with a past medical history significant for hypertension, left TKA, migraines, rosacea, bilateral cataract surgery in 2009, hypothyroidism, senile osteoporosis, and rheumatoid arthritis who is seen for follow-up of RA.      Today: Doing well.  No joint pain.  Morning stiffness for no more than 5 minutes.  She reports that she is able to do all of her daily activities except for opening really tight jars, something that she says she has never been able to do.  Tolerating methotrexate well.  She states that she appreciates these telephone visits to reduce her risk of COVID-19, and is happy to continue doing it this way because she is feeling so good with regard to her rheumatoid arthritis.    Denies fevers, chills, nausea, vomiting, constipation, diarrhea. No abdominal pain. No chest pain/pressure, palpitations, or shortness of breath. No LE swelling. No oral or nasal sores.  No rash. No sicca symptoms.     Tobacco: none  EtOH: none  Drugs: none  Occupation: [employer], now retired    ROS   GEN: No fevers, chills, night sweats, or weight change  SKIN: No itching, rashes, sores  HEENT:No oral or nasal ulcers.  CV: No chest pain, pressure, palpitations, or dyspnea on exertion.  PULM: No SOB, wheeze, cough.  GI: No nausea, vomiting, constipation, diarrhea. No blood in stool. No abdominal pain.  MSK: See HPI.  NEURO: No numbness or tingling  EXT: No LE swelling  PSYCH: Negative    Active Problem List     Patient Active Problem List   Diagnosis     Actinic  keratosis     Essential hypertension     GA (granuloma annulare)     Headache     Hypothyroidism     Meningioma (H)     Rheumatoid arthritis involving both hands with positive rheumatoid factor (H)     Rosacea     Senile osteoporosis     Sensorineural hearing loss, bilateral     Neck pain     Past Medical History   See above    Past Surgical History   Left TKA    Allergy     Allergies   Allergen Reactions     Oxycodone-Acetaminophen Anxiety     After TKA she developed marked post operative anxiety felt to be due to the Percocet; took it again after going home and symptoms recurred.     Penicillins Rash     Current Medication List     Current Outpatient Medications   Medication Sig     diclofenac (VOLTAREN) 75 MG EC tablet Take 75 mg by mouth 2 times daily     folic acid (FOLVITE) 1 MG tablet Take 1 tablet (1 mg) by mouth daily     hydrochlorothiazide (HYDRODIURIL) 25 MG tablet Take 25 mg by mouth daily     levothyroxine (SYNTHROID/LEVOTHROID) 88 MCG tablet Take 88 mcg by mouth daily     losartan (COZAAR) 50 MG tablet Take 75 mg by mouth daily     magnesium 250 MG tablet Take 1 tablet by mouth daily     methotrexate sodium 2.5 MG TABS Take 7 tablets (17.5 mg) by mouth once a week . Take all 7 tablets on the same day of each week.     Vitamin D, Cholecalciferol, 400 units TABS      diclofenac (VOLTAREN) 1 % topical gel Apply up to 4 times per day to the hands as needed; no more than 2 grams per application to the upper extremities. (Patient not taking: Reported on 10/10/2019)     No current facility-administered medications for this visit.      Social History   See HPI    Family History     Family History   Problem Relation Age of Onset     Diabetes Mother      Diabetes Father        Physical Exam     Temp Readings from Last 3 Encounters:   06/06/19 97.9  F (36.6  C) (Oral)   02/14/19 96.3  F (35.7  C) (Oral)     BP Readings from Last 5 Encounters:   02/06/20 132/89   10/10/19 116/80   06/06/19 122/77   02/14/19  "162/80     Pulse Readings from Last 1 Encounters:   02/06/20 70     Resp Readings from Last 1 Encounters:   No data found for Resp     Estimated body mass index is 24.3 kg/m  as calculated from the following:    Height as of 2/6/20: 1.588 m (5' 2.5\").    Weight as of 2/6/20: 61.2 kg (135 lb).    Telephone visit    Labs / Imaging (select studies)       CBC  Recent Labs   Lab Test 05/21/20  0742 10/10/19  1118   WBC 6.1 5.9   RBC 4.31 4.31   HGB 12.4 12.4   HCT 38.3 37.5   MCV 89 87   RDW 15.0 16.1*    251   MCH 28.8 28.8   MCHC 32.4 33.1   NEUTROPHIL 58.9 65.0   LYMPH 26.7 24.7   MONOCYTE 12.1 8.8   EOSINOPHIL 1.3 1.2   BASOPHIL 0.5 0.3   ANEU 3.6 3.8   ALYM 1.6 1.5   NGOC 0.7 0.5   AEOS 0.1 0.1   ABAS 0.0 0.0     CMP  Recent Labs   Lab Test 05/21/20  0742 10/10/19  1118 07/15/19   CR 0.83 0.78 0.79   GFRESTIMATED 66 71 >60   GFRESTBLACK 76 83 >60   BILITOTAL 0.4 0.4  --    ALBUMIN 3.6 4.0  --    PROTTOTAL 7.4 7.9  --    ALKPHOS 37* 40  --    AST 19 24 20   ALT 22 21 9     ESR/CRP  Recent Labs   Lab Test 05/21/20  0742 10/10/19  1118   SED 60* 48*   CRP <2.9 <2.9     Hepatitis B  Recent Labs   Lab Test 10/10/19  1118   HBCAB Nonreactive       Immunization History     There is no immunization history on file for this patient.       Chart documentation done in part with Dragon Voice recognition Software. Although reviewed after completion, some word and grammatical error may remain.    Phone call start time: 1:22 PM  Phone call end time: 1:28 PM    This visit is equivalent to a 71028 visit    Location of patient: home, in [place]  Location of provider: home    Follow up:  follow up appointment scheduled to be in Jan 2021    Phill Villa MD          "

## 2020-09-16 ENCOUNTER — TELEPHONE (OUTPATIENT)
Dept: RHEUMATOLOGY | Facility: CLINIC | Age: 80
End: 2020-09-16

## 2020-09-16 NOTE — TELEPHONE ENCOUNTER
Reason for call:  other    Phone number to reach patient: [phone]     Best Time:  Any     Can we leave a detailed message on this number?      Patient was given a phone number to call to schedule lab work by Dr Villa. The number she was given in in correct, she does not get an answer. Please call to advise.

## 2020-09-18 NOTE — TELEPHONE ENCOUNTER
Called patient and scheduled her a lab apt on 9/21/20 at the [facility]. Provided directions as well. Patient has no further questions.    Osei Rodriguez RN....9/18/2020 2:32 PM

## 2020-09-18 NOTE — TELEPHONE ENCOUNTER
Called patient and provided her scheduling information for [place] lab.    Osei Rodriguez RN....9/18/2020 9:04 AM

## 2020-09-25 DIAGNOSIS — M05.79 RHEUMATOID ARTHRITIS INVOLVING MULTIPLE SITES WITH POSITIVE RHEUMATOID FACTOR (H): ICD-10-CM

## 2020-09-25 DIAGNOSIS — Z79.899 HIGH RISK MEDICATION USE: ICD-10-CM

## 2020-09-25 DIAGNOSIS — Z11.59 ENCOUNTER FOR SCREENING FOR OTHER VIRAL DISEASES: ICD-10-CM

## 2020-09-25 LAB
ALBUMIN SERPL-MCNC: 3.8 G/DL (ref 3.4–5)
ALP SERPL-CCNC: 36 U/L (ref 40–150)
ALT SERPL W P-5'-P-CCNC: 22 U/L (ref 0–50)
AST SERPL W P-5'-P-CCNC: 22 U/L (ref 0–45)
BASOPHILS # BLD AUTO: 0 10E9/L (ref 0–0.2)
BASOPHILS NFR BLD AUTO: 0.3 %
BILIRUB DIRECT SERPL-MCNC: 0.1 MG/DL (ref 0–0.2)
BILIRUB SERPL-MCNC: 0.5 MG/DL (ref 0.2–1.3)
CREAT SERPL-MCNC: 0.86 MG/DL (ref 0.52–1.04)
CRP SERPL-MCNC: <2.9 MG/L (ref 0–8)
DIFFERENTIAL METHOD BLD: ABNORMAL
EOSINOPHIL # BLD AUTO: 0.1 10E9/L (ref 0–0.7)
EOSINOPHIL NFR BLD AUTO: 1.1 %
ERYTHROCYTE [DISTWIDTH] IN BLOOD BY AUTOMATED COUNT: 15.5 % (ref 10–15)
ERYTHROCYTE [SEDIMENTATION RATE] IN BLOOD BY WESTERGREN METHOD: 29 MM/H (ref 0–30)
GFR SERPL CREATININE-BSD FRML MDRD: 63 ML/MIN/{1.73_M2}
HCT VFR BLD AUTO: 39.1 % (ref 35–47)
HGB BLD-MCNC: 13 G/DL (ref 11.7–15.7)
LYMPHOCYTES # BLD AUTO: 1.5 10E9/L (ref 0.8–5.3)
LYMPHOCYTES NFR BLD AUTO: 22.3 %
MCH RBC QN AUTO: 29 PG (ref 26.5–33)
MCHC RBC AUTO-ENTMCNC: 33.2 G/DL (ref 31.5–36.5)
MCV RBC AUTO: 87 FL (ref 78–100)
MONOCYTES # BLD AUTO: 0.5 10E9/L (ref 0–1.3)
MONOCYTES NFR BLD AUTO: 8.1 %
NEUTROPHILS # BLD AUTO: 4.5 10E9/L (ref 1.6–8.3)
NEUTROPHILS NFR BLD AUTO: 68.2 %
PLATELET # BLD AUTO: 268 10E9/L (ref 150–450)
PROT SERPL-MCNC: 7.6 G/DL (ref 6.8–8.8)
RBC # BLD AUTO: 4.49 10E12/L (ref 3.8–5.2)
WBC # BLD AUTO: 6.6 10E9/L (ref 4–11)

## 2020-09-25 PROCEDURE — 80076 HEPATIC FUNCTION PANEL: CPT | Performed by: INTERNAL MEDICINE

## 2020-09-25 PROCEDURE — 85652 RBC SED RATE AUTOMATED: CPT | Performed by: INTERNAL MEDICINE

## 2020-09-25 PROCEDURE — 86140 C-REACTIVE PROTEIN: CPT | Performed by: INTERNAL MEDICINE

## 2020-09-25 PROCEDURE — 87340 HEPATITIS B SURFACE AG IA: CPT | Performed by: INTERNAL MEDICINE

## 2020-09-25 PROCEDURE — 85025 COMPLETE CBC W/AUTO DIFF WBC: CPT | Performed by: INTERNAL MEDICINE

## 2020-09-25 PROCEDURE — 36415 COLL VENOUS BLD VENIPUNCTURE: CPT | Performed by: INTERNAL MEDICINE

## 2020-09-25 PROCEDURE — 82565 ASSAY OF CREATININE: CPT | Performed by: INTERNAL MEDICINE

## 2020-09-25 PROCEDURE — 86704 HEP B CORE ANTIBODY TOTAL: CPT | Performed by: INTERNAL MEDICINE

## 2020-09-28 LAB
HBV CORE AB SERPL QL IA: NONREACTIVE
HBV SURFACE AG SERPL QL IA: NONREACTIVE

## 2020-12-04 ENCOUNTER — TRANSFERRED RECORDS (OUTPATIENT)
Dept: HEALTH INFORMATION MANAGEMENT | Facility: CLINIC | Age: 80
End: 2020-12-04

## 2020-12-28 DIAGNOSIS — M05.79 RHEUMATOID ARTHRITIS INVOLVING MULTIPLE SITES WITH POSITIVE RHEUMATOID FACTOR (H): ICD-10-CM

## 2020-12-28 NOTE — TELEPHONE ENCOUNTER
Methotrexate       Last Written Prescription Date:  09/03/20  Last Fill Quantity: 84,   # refills: 0  Last Office Visit: 09/10/20  Future Office visit:   01/13/21    Routing refill request to provider for review/approval because:    [facility]  [address]  [place]    P- [phone]  F- [phone]

## 2020-12-29 NOTE — TELEPHONE ENCOUNTER
Patient is due for methotrexate toxicity monitoring labs. Rheumatology MA, please call patient to schedule a lab apt.    Osei Rodriguez RN....12/29/2020 1:19 PM

## 2020-12-30 RX ORDER — METHOTREXATE 2.5 MG/1
17.5 TABLET ORAL WEEKLY
Qty: 84 TABLET | Refills: 0 | OUTPATIENT
Start: 2020-12-30

## 2020-12-30 NOTE — TELEPHONE ENCOUNTER
Pat Prieto's note noted.  Pt has enough med to get her to her upcoming [facility] appointment and can address at that time.  Phill Villa MD  12/30/2020 12:03 PM

## 2020-12-30 NOTE — TELEPHONE ENCOUNTER
I called pt and she had labs done at [facility]. I am not able to see them in care everywhere. She has an appt with Dr Villa on 1/13/21. She has enough medication up to the appt. I told her to print out her labs so she is able to review them with Dr Villa at her f/u appt.    TOÑO Cottrell

## 2021-01-12 NOTE — PATIENT INSTRUCTIONS
RHEUMATOLOGY    Dr. Phill Villa    [facility]  [address] [place] [address]    Our new phone number for Rheumatology is [phone], this number will be able to help you schedule appointments for Dr. Villa or if you have any message you would like sent to us.    Thank you for choosing [facility]!  Abi Sauceda [facility] Rheumatology

## 2021-01-12 NOTE — PROGRESS NOTES
Nelda Palma is a 80 year old year old female who is being evaluated via a billable telephone visit.      What phone number would you like to be contacted at? [phone]  How would you like to obtain your AVS? [facility]      Rheumatology Telephone/TeleHealth Visit      Nelda Palma MRN# [id number]   YOB: 1940 Age: 80 year old      Date of visit: 1/13/21   PCP: Dr. Shawn Bradley at the [facility]  Endocrinologist: Dr. Aditya De Leon at [facility].     Chief Complaint   Patient presents with:  Arthritis    Assessment and Plan     1.  Seropositive erosive rheumatoid arthritis (, CCP >2776.8): Reportedly diagnosed in 2004.  Reportedly on hydroxychloroquine in the past that was effective and slowly tapered off.  Note that the patient reports her ophthalmologist telling her that Plaquenil can be very toxic to her eyes and the patient is under the impression that she is not supposed to restart Plaquenil.  Ophthalmology records available for review did not document a contraindication to using Plaquenil.  Regardless, she has erosive disease so methotrexate was started 2/15/2019 with significant improvement.   2/6/2020: Mild synovitis across the MCPs so MTX was increased with resolution of the inflammatory symptoms.  Doing well at this time, now only with degenerative symptoms at her right second and third DIPs.  Chronic issue, stable  - Continue methotrexate 17.5 mg once weekly   - Continue folic acid 1mg daily  - Labs in 3 months: CBC, Creatinine, Hepatic Panel, ESR, CRP    Recent labs reviewed in [facility] that were done at [facility], including a CBC, creatinine, and hepatic panel, and independent interpretation of these tests reveal no evidence of medication toxicity.  Additional labs were ordered for HIGH RISK MEDICATION TOXICITY MONITORING and disease activity evaluation.  Management options discussed and implemented after shared medical decision making with the patient.      2.  Neck pain in the setting of RA, history: reportedly a chronic issue for years.  No instability or degenerative change seen on 2/14/2019 x-ray.  Significant improvement with physical therapy exercises.  Not an issue today    3. Bilateral knee pain: mild.  Crepitation on previous exam.  By history it is suggestive of patellofemoral syndrome.  Significant improvement with physical therapy exercises    4. Bilateral hand osteoarthritis: Mild symptoms and not bothering her much.  Primarily affecting her at the DIPs.  Chronic issue, stable    5. Osteoporosis: Managed by her endocrinologist, Dr. Aditya De Leon at [facility].     Discussed the COVID-19 vaccine currently available    # Relevant labs and imaging were reviewed with the patient    # High risk medication toxicity monitoring: discussion and labs reviewed; appropriate labs ordered. See above.  Instructed that if confirmed to have COVID-19 or exposure to someone with confirmed COVID-19 to call this clinic for directions on DMARD management.    # Considered to be at high risk of complications from the COVID-19 virus.  It is recommended to limit contact with other people and if possible to work remotely or provide a leave of absence to reduce the risk for COVID-19.      Total minutes spent in evaluation with patient, review of pertinent lab tests and chart notes, and documentation: 12      Ms. Palma verbalized agreement with and understanding of the rational for the diagnosis and treatment plan.  All questions were answered to best of my ability and the patient's satisfaction. Ms. Palma was advised to contact the clinic with any questions that may arise after the clinic visit.      Thank you for involving me in the care of the patient    Return to clinic: 3 months      HPI   Nelda Palma is a 80 year old female with a past medical history significant for hypertension, left TKA, migraines, rosacea, bilateral cataract surgery in 2009, hypothyroidism, senile osteoporosis,  and rheumatoid arthritis who is seen for follow-up of RA.      Today, 1/13/2021: right 2nd and 3rd DIPs with occasional swelling and mildly painful but not to the point of affecting her daily activities and it is very tolerable she says.  Other joints are doing well.  Tolerating methotrexate and folic acid.  Had labs done at [facility] last.  No morning stiffness.  No gelling phenomenon.    Denies fevers, chills, nausea, vomiting, constipation, diarrhea. No abdominal pain. No chest pain/pressure, palpitations, or shortness of breath. No LE swelling. No oral or nasal sores.  No rash. No sicca symptoms.     Tobacco: none  EtOH: none  Drugs: none  Occupation: [employer], now retired    ROS   GEN: No fevers, chills, night sweats, or weight change  SKIN: No itching, rashes, sores  HEENT:No oral or nasal ulcers.  CV: No chest pain, pressure, palpitations, or dyspnea on exertion.  PULM: No SOB, wheeze, cough.  GI: No nausea, vomiting, constipation, diarrhea. No blood in stool. No abdominal pain.  MSK: See HPI.  NEURO: No numbness or tingling  EXT: No LE swelling  PSYCH: Negative    Active Problem List     Patient Active Problem List   Diagnosis     Actinic keratosis     Essential hypertension     GA (granuloma annulare)     Headache     Hypothyroidism     Meningioma (H)     Rheumatoid arthritis involving both hands with positive rheumatoid factor (H)     Rosacea     Senile osteoporosis     Sensorineural hearing loss, bilateral     Neck pain     Past Medical History   See above    Past Surgical History   Left TKA    Allergy     Allergies   Allergen Reactions     Oxycodone-Acetaminophen Anxiety     After TKA she developed marked post operative anxiety felt to be due to the Percocet; took it again after going home and symptoms recurred.     Penicillins Rash     Current Medication List     Current Outpatient Medications   Medication Sig     folic acid (FOLVITE) 1 MG tablet Take 1 tablet (1 mg) by mouth daily      "hydrochlorothiazide (HYDRODIURIL) 25 MG tablet Take 25 mg by mouth daily     levothyroxine (SYNTHROID/LEVOTHROID) 88 MCG tablet Take 88 mcg by mouth daily     losartan (COZAAR) 50 MG tablet Take 75 mg by mouth daily     magnesium 250 MG tablet Take 1 tablet by mouth daily     methotrexate sodium 2.5 MG TABS Take 7 tablets (17.5 mg) by mouth once a week . Take all 7 tablets on the same day of each week.     Vitamin D, Cholecalciferol, 400 units TABS      diclofenac (VOLTAREN) 1 % topical gel Apply up to 4 times per day to the hands as needed; no more than 2 grams per application to the upper extremities. (Patient not taking: Reported on 10/10/2019)     diclofenac (VOLTAREN) 75 MG EC tablet Take 75 mg by mouth 2 times daily     No current facility-administered medications for this visit.      Social History   See HPI    Family History     Family History   Problem Relation Age of Onset     Diabetes Mother      Diabetes Father        Physical Exam     Temp Readings from Last 3 Encounters:   06/06/19 97.9  F (36.6  C) (Oral)   02/14/19 96.3  F (35.7  C) (Oral)     BP Readings from Last 5 Encounters:   02/06/20 132/89   10/10/19 116/80   06/06/19 122/77   02/14/19 162/80     Pulse Readings from Last 1 Encounters:   02/06/20 70     Resp Readings from Last 1 Encounters:   No data found for Resp     Estimated body mass index is 24.3 kg/m  as calculated from the following:    Height as of 2/6/20: 1.588 m (5' 2.5\").    Weight as of 2/6/20: 61.2 kg (135 lb).    GEN: alert and no distress  PSYCH: Alert; coherent speech, normal rate and volume, able to articulate logical thoughts, able   to abstract reason, no tangential thoughts. Normal affect.   RESP: No cough, no audible wheezing, able to talk in full sentences  Remainder of exam unable to be completed due to telephone visits      Labs / Imaging (select studies)     CBC  Recent Labs   Lab Test 09/25/20  0921 05/21/20  0742 10/10/19  1118   WBC 6.6 6.1 5.9   RBC 4.49 4.31 4.31 "   HGB 13.0 12.4 12.4   HCT 39.1 38.3 37.5   MCV 87 89 87   RDW 15.5* 15.0 16.1*    289 251   MCH 29.0 28.8 28.8   MCHC 33.2 32.4 33.1   NEUTROPHIL 68.2 58.9 65.0   LYMPH 22.3 26.7 24.7   MONOCYTE 8.1 12.1 8.8   EOSINOPHIL 1.1 1.3 1.2   BASOPHIL 0.3 0.5 0.3   ANEU 4.5 3.6 3.8   ALYM 1.5 1.6 1.5   NGOC 0.5 0.7 0.5   AEOS 0.1 0.1 0.1   ABAS 0.0 0.0 0.0     CMP  Recent Labs   Lab Test 09/25/20  0921 05/21/20  0742 10/10/19  1118   CR 0.86 0.83 0.78   GFRESTIMATED 63 66 71   GFRESTBLACK 73 76 83   BILITOTAL 0.5 0.4 0.4   ALBUMIN 3.8 3.6 4.0   PROTTOTAL 7.6 7.4 7.9   ALKPHOS 36* 37* 40   AST 22 19 24   ALT 22 22 21     ESR/CRP  Recent Labs   Lab Test 09/25/20  0921 05/21/20  0742 10/10/19  1118   SED 29 60* 48*   CRP <2.9 <2.9 <2.9     Hepatitis B  Recent Labs   Lab Test 09/25/20  0921 10/10/19  1118   HBCAB Nonreactive Nonreactive   HEPBANG Nonreactive  --          Immunization History     There is no immunization history on file for this patient.       Chart documentation done in part with Dragon Voice recognition Software. Although reviewed after completion, some word and grammatical error may remain.    Phone call duration with patient (in minutes): 8    Location of patient: [place], [place]   Location of provider: [place]    Phill Villa MD

## 2021-01-13 ENCOUNTER — VIRTUAL VISIT (OUTPATIENT)
Dept: RHEUMATOLOGY | Facility: CLINIC | Age: 81
End: 2021-01-13
Payer: COMMERCIAL

## 2021-01-13 DIAGNOSIS — M19.041 OSTEOARTHRITIS OF BOTH HANDS, UNSPECIFIED OSTEOARTHRITIS TYPE: ICD-10-CM

## 2021-01-13 DIAGNOSIS — Z79.899 HIGH RISK MEDICATION USE: ICD-10-CM

## 2021-01-13 DIAGNOSIS — M19.042 OSTEOARTHRITIS OF BOTH HANDS, UNSPECIFIED OSTEOARTHRITIS TYPE: ICD-10-CM

## 2021-01-13 DIAGNOSIS — M05.79 RHEUMATOID ARTHRITIS INVOLVING MULTIPLE SITES WITH POSITIVE RHEUMATOID FACTOR (H): Primary | ICD-10-CM

## 2021-01-13 PROCEDURE — 99213 OFFICE O/P EST LOW 20 MIN: CPT | Mod: 95 | Performed by: INTERNAL MEDICINE

## 2021-01-13 RX ORDER — METHOTREXATE 2.5 MG/1
17.5 TABLET ORAL WEEKLY
Qty: 84 TABLET | Refills: 1 | Status: SHIPPED | OUTPATIENT
Start: 2021-01-13 | End: 2021-04-07

## 2021-01-13 RX ORDER — FOLIC ACID 1 MG/1
1 TABLET ORAL DAILY
Qty: 90 TABLET | Refills: 3 | Status: SHIPPED | OUTPATIENT
Start: 2021-01-13 | End: 2021-10-07

## 2021-01-15 ENCOUNTER — HEALTH MAINTENANCE LETTER (OUTPATIENT)
Age: 81
End: 2021-01-15

## 2021-04-07 ENCOUNTER — OFFICE VISIT (OUTPATIENT)
Dept: RHEUMATOLOGY | Facility: CLINIC | Age: 81
End: 2021-04-07
Payer: COMMERCIAL

## 2021-04-07 VITALS
HEIGHT: 62 IN | OXYGEN SATURATION: 95 % | DIASTOLIC BLOOD PRESSURE: 80 MMHG | WEIGHT: 135 LBS | SYSTOLIC BLOOD PRESSURE: 179 MMHG | BODY MASS INDEX: 24.84 KG/M2

## 2021-04-07 DIAGNOSIS — M19.042 OSTEOARTHRITIS OF BOTH HANDS, UNSPECIFIED OSTEOARTHRITIS TYPE: ICD-10-CM

## 2021-04-07 DIAGNOSIS — M19.041 OSTEOARTHRITIS OF BOTH HANDS, UNSPECIFIED OSTEOARTHRITIS TYPE: ICD-10-CM

## 2021-04-07 DIAGNOSIS — M05.79 RHEUMATOID ARTHRITIS INVOLVING MULTIPLE SITES WITH POSITIVE RHEUMATOID FACTOR (H): Primary | ICD-10-CM

## 2021-04-07 DIAGNOSIS — Z79.899 HIGH RISK MEDICATION USE: ICD-10-CM

## 2021-04-07 LAB
ALBUMIN SERPL-MCNC: 3.6 G/DL (ref 3.4–5)
ALP SERPL-CCNC: 38 U/L (ref 40–150)
ALT SERPL W P-5'-P-CCNC: 26 U/L (ref 0–50)
AST SERPL W P-5'-P-CCNC: 22 U/L (ref 0–45)
BASOPHILS # BLD AUTO: 0 10E9/L (ref 0–0.2)
BASOPHILS NFR BLD AUTO: 0.3 %
BILIRUB DIRECT SERPL-MCNC: 0.1 MG/DL (ref 0–0.2)
BILIRUB SERPL-MCNC: 0.4 MG/DL (ref 0.2–1.3)
CREAT SERPL-MCNC: 0.84 MG/DL (ref 0.52–1.04)
CRP SERPL-MCNC: <2.9 MG/L (ref 0–8)
DIFFERENTIAL METHOD BLD: NORMAL
EOSINOPHIL # BLD AUTO: 0.1 10E9/L (ref 0–0.7)
EOSINOPHIL NFR BLD AUTO: 1.2 %
ERYTHROCYTE [DISTWIDTH] IN BLOOD BY AUTOMATED COUNT: 14.9 % (ref 10–15)
ERYTHROCYTE [SEDIMENTATION RATE] IN BLOOD BY WESTERGREN METHOD: 36 MM/H (ref 0–30)
GFR SERPL CREATININE-BSD FRML MDRD: 65 ML/MIN/{1.73_M2}
HCT VFR BLD AUTO: 36.5 % (ref 35–47)
HGB BLD-MCNC: 12.2 G/DL (ref 11.7–15.7)
LYMPHOCYTES # BLD AUTO: 1.6 10E9/L (ref 0.8–5.3)
LYMPHOCYTES NFR BLD AUTO: 20.6 %
MCH RBC QN AUTO: 29.2 PG (ref 26.5–33)
MCHC RBC AUTO-ENTMCNC: 33.4 G/DL (ref 31.5–36.5)
MCV RBC AUTO: 87 FL (ref 78–100)
MONOCYTES # BLD AUTO: 0.7 10E9/L (ref 0–1.3)
MONOCYTES NFR BLD AUTO: 9.7 %
NEUTROPHILS # BLD AUTO: 5.2 10E9/L (ref 1.6–8.3)
NEUTROPHILS NFR BLD AUTO: 68.2 %
PLATELET # BLD AUTO: 303 10E9/L (ref 150–450)
PROT SERPL-MCNC: 7.2 G/DL (ref 6.8–8.8)
RBC # BLD AUTO: 4.18 10E12/L (ref 3.8–5.2)
WBC # BLD AUTO: 7.5 10E9/L (ref 4–11)

## 2021-04-07 PROCEDURE — 86140 C-REACTIVE PROTEIN: CPT | Performed by: INTERNAL MEDICINE

## 2021-04-07 PROCEDURE — 80076 HEPATIC FUNCTION PANEL: CPT | Performed by: INTERNAL MEDICINE

## 2021-04-07 PROCEDURE — 85652 RBC SED RATE AUTOMATED: CPT | Performed by: INTERNAL MEDICINE

## 2021-04-07 PROCEDURE — 99214 OFFICE O/P EST MOD 30 MIN: CPT | Performed by: INTERNAL MEDICINE

## 2021-04-07 PROCEDURE — 36415 COLL VENOUS BLD VENIPUNCTURE: CPT | Performed by: INTERNAL MEDICINE

## 2021-04-07 PROCEDURE — 85025 COMPLETE CBC W/AUTO DIFF WBC: CPT | Performed by: INTERNAL MEDICINE

## 2021-04-07 PROCEDURE — 82565 ASSAY OF CREATININE: CPT | Performed by: INTERNAL MEDICINE

## 2021-04-07 RX ORDER — METHOTREXATE 2.5 MG/1
17.5 TABLET ORAL WEEKLY
Qty: 91 TABLET | Refills: 1 | Status: SHIPPED | OUTPATIENT
Start: 2021-04-07 | End: 2021-10-07

## 2021-04-07 ASSESSMENT — MIFFLIN-ST. JEOR: SCORE: 1025.61

## 2021-04-07 NOTE — PROGRESS NOTES
Rheumatology Clinic  Visit      Nelda Palma MRN# [id number]   YOB: 1940 Age: 80 year old      Date of visit: 4/07/21   PCP: Dr. Shawn Bradley at the [facility]  Endocrinologist: Dr. Aditya De Leon at [facility].     Chief Complaint   Patient presents with:  RECHECK    Assessment and Plan     1.  Seropositive erosive rheumatoid arthritis (, CCP >2776.8): Reportedly diagnosed in 2004.  Reportedly on hydroxychloroquine in the past that was effective and slowly tapered off.  Note that the patient reports her ophthalmologist telling her that Plaquenil can be very toxic to her eyes and the patient is under the impression that she is not supposed to restart Plaquenil.  Ophthalmology records available for review did not document a contraindication to using Plaquenil.  Regardless, she has erosive disease so methotrexate was started 2/15/2019 with significant improvement.   2/6/2020: Mild synovitis across the MCPs so MTX was increased with resolution of the inflammatory symptoms.  Doing well at this time, now only with degenerative symptoms at her right second and third DIPs.  Chronic issue, stable  - Continue methotrexate 17.5 mg once weekly   - Continue folic acid 1mg daily  - Labs today: CBC, creatinine, hepatic panel, ESR, CRP  - Labs in 3 months: CBC, Creatinine, Hepatic Panel  - Labs in 6 months: CBC, Creatinine, Hepatic Panel, ESR, CRP     High risk medication requiring intensive toxicity monitoring at least quarterly: labs ordered include CBC, Creatinine, Hepatic panel to monitor for cytopenia and hepatotoxicity; checking creatinine as it affects clearance of medication.      2. Neck pain in the setting of RA, history: reportedly a chronic issue for years.  No instability or degenerative change seen on 2/14/2019 x-ray.  Significant improvement with physical therapy exercises but more symptomatic today as she has not been doing the exercises.  She is going to restart  exercises.    3. Bilateral knee pain: Patellofemoral syndrome.  Significant improvement with physical therapy exercises    4. Bilateral hand osteoarthritis: Mild symptoms and not bothering her much.  Primarily affecting her at the DIPs.  Chronic issue, stable    5. Osteoporosis: Managed by her endocrinologist, Dr. Aditya De Leon at [facility].     # s/p 2 doses of the Moderna COVID19 vaccine    Total minutes spent in evaluation with patient, documentation, , and review of pertinent studies and chart notes: 18     Ms. Palma verbalized agreement with and understanding of the rational for the diagnosis and treatment plan.  All questions were answered to best of my ability and the patient's satisfaction. Ms. Palma was advised to contact the clinic with any questions that may arise after the clinic visit.      Thank you for involving me in the care of the patient    Return to clinic: 3 months      HPI   Nelda Palma is a 80 year old female with a past medical history significant for hypertension, left TKA, migraines, rosacea, bilateral cataract surgery in 2009, hypothyroidism, senile osteoporosis, and rheumatoid arthritis who is seen for follow-up of RA.      Today, 4/7/2021: Doing well this time.  No joint pain or swelling.  Morning stiffness for no more than 30 seconds; she says her hands are stiff initially but then she wiggles her fingers and she feels much better.  Tolerating methotrexate well.    Denies fevers, chills, nausea, vomiting, constipation, diarrhea. No abdominal pain. No chest pain/pressure, palpitations, or shortness of breath. No LE swelling. No oral or nasal sores.  No rash. No sicca symptoms.     Tobacco: none  EtOH: none  Drugs: none  Occupation: [employer], now retired    ROS   GEN: No fevers, chills, night sweats, or weight change  SKIN: No itching, rashes, sores  HEENT:No oral or nasal ulcers.  CV: No chest pain, pressure, palpitations, or dyspnea on exertion.  PULM: No  SOB, wheeze, cough.  GI: No nausea, vomiting, constipation, diarrhea. No blood in stool. No abdominal pain.  MSK: See HPI.  NEURO: No numbness or tingling  EXT: No LE swelling  PSYCH: Negative    Active Problem List     Patient Active Problem List   Diagnosis     Actinic keratosis     Essential hypertension     GA (granuloma annulare)     Headache     Hypothyroidism     Meningioma (H)     Rheumatoid arthritis involving both hands with positive rheumatoid factor (H)     Rosacea     Senile osteoporosis     Sensorineural hearing loss, bilateral     Neck pain     Past Medical History   See above    Past Surgical History   Left TKA    Allergy     Allergies   Allergen Reactions     Oxycodone-Acetaminophen Anxiety     After TKA she developed marked post operative anxiety felt to be due to the Percocet; took it again after going home and symptoms recurred.     Penicillins Rash     Current Medication List     Current Outpatient Medications   Medication Sig     diclofenac (VOLTAREN) 1 % topical gel Apply up to 4 times per day to the hands as needed; no more than 2 grams per application to the upper extremities. (Patient not taking: Reported on 10/10/2019)     diclofenac (VOLTAREN) 75 MG EC tablet Take 75 mg by mouth 2 times daily     folic acid (FOLVITE) 1 MG tablet Take 1 tablet (1 mg) by mouth daily     hydrochlorothiazide (HYDRODIURIL) 25 MG tablet Take 25 mg by mouth daily     levothyroxine (SYNTHROID/LEVOTHROID) 88 MCG tablet Take 88 mcg by mouth daily     losartan (COZAAR) 50 MG tablet Take 75 mg by mouth daily     magnesium 250 MG tablet Take 1 tablet by mouth daily     methotrexate sodium 2.5 MG TABS Take 7 tablets (17.5 mg) by mouth once a week . Take all 7 tablets on the same day of each week.     Vitamin D, Cholecalciferol, 400 units TABS      No current facility-administered medications for this visit.      Social History   See HPI    Family History     Family History   Problem Relation Age of Onset     Diabetes  "Mother      Diabetes Father        Physical Exam     Temp Readings from Last 3 Encounters:   06/06/19 97.9  F (36.6  C) (Oral)   02/14/19 96.3  F (35.7  C) (Oral)     BP Readings from Last 5 Encounters:   04/07/21 (!) 179/80   02/06/20 132/89   10/10/19 116/80   06/06/19 122/77   02/14/19 162/80     Pulse Readings from Last 1 Encounters:   02/06/20 70     Resp Readings from Last 1 Encounters:   No data found for Resp     Estimated body mass index is 24.69 kg/m  as calculated from the following:    Height as of this encounter: 1.575 m (5' 2\").    Weight as of this encounter: 61.2 kg (135 lb).      GEN: NAD. Healthy appearing adult.   HEENT:  Anicteric, noninjected sclera. No obvious external lesions of the ear and nose. Hearing intact.  PULM: No increased work of breathing  MSK: Chronic synovial swelling at the right second and third MCPs without to palpation, increased warmth, or overlying erythema.  Other MCPs without swelling or tenderness to palpation.  PIPs, DIPs without swelling or tenderness to palpation.  Wrists without swelling or tenderness to palpation.  Elbows and shoulders without swelling or tenderness to palpation.  Knees, ankles, and MTPs without swelling or tenderness to palpation.    SKIN: No rash or jaundice seen  PSYCH: Alert. Appropriate.        Labs / Imaging (select studies)     CBC  Recent Labs   Lab Test 09/25/20  0921 05/21/20  0742 10/10/19  1118   WBC 6.6 6.1 5.9   RBC 4.49 4.31 4.31   HGB 13.0 12.4 12.4   HCT 39.1 38.3 37.5   MCV 87 89 87   RDW 15.5* 15.0 16.1*    289 251   MCH 29.0 28.8 28.8   MCHC 33.2 32.4 33.1   NEUTROPHIL 68.2 58.9 65.0   LYMPH 22.3 26.7 24.7   MONOCYTE 8.1 12.1 8.8   EOSINOPHIL 1.1 1.3 1.2   BASOPHIL 0.3 0.5 0.3   ANEU 4.5 3.6 3.8   ALYM 1.5 1.6 1.5   NGOC 0.5 0.7 0.5   AEOS 0.1 0.1 0.1   ABAS 0.0 0.0 0.0     CMP  Recent Labs   Lab Test 09/25/20  0921 05/21/20  0742 10/10/19  1118   CR 0.86 0.83 0.78   GFRESTIMATED 63 66 71   GFRESTBLACK 73 76 83   BILITOTAL " 0.5 0.4 0.4   ALBUMIN 3.8 3.6 4.0   PROTTOTAL 7.6 7.4 7.9   ALKPHOS 36* 37* 40   AST 22 19 24   ALT 22 22 21       ESR/CRP  Recent Labs   Lab Test 09/25/20  0921 05/21/20  0742 10/10/19  1118   SED 29 60* 48*   CRP <2.9 <2.9 <2.9       Hepatitis B  Recent Labs   Lab Test 09/25/20  0921 10/10/19  1118   HBCAB Nonreactive Nonreactive   HEPBANG Nonreactive  --        Immunization History     There is no immunization history on file for this patient.       Chart documentation done in part with Dragon Voice recognition Software. Although reviewed after completion, some word and grammatical error may remain.      Phill Villa MD

## 2021-06-25 ENCOUNTER — TELEPHONE (OUTPATIENT)
Dept: RHEUMATOLOGY | Facility: CLINIC | Age: 81
End: 2021-06-25

## 2021-06-25 NOTE — TELEPHONE ENCOUNTER
Called patient and advised her to contact the pharmacy for a refill. Patient stated she will do this.    Osei FRAGOSO RN....6/25/2021 2:19 PM

## 2021-06-25 NOTE — TELEPHONE ENCOUNTER
Patient is calling because she needs a refill of methotrexate. She has also notified the pharmacy. Pharmacy is [facility] in [facility].

## 2021-07-08 DIAGNOSIS — M05.79 RHEUMATOID ARTHRITIS INVOLVING MULTIPLE SITES WITH POSITIVE RHEUMATOID FACTOR (H): ICD-10-CM

## 2021-07-08 DIAGNOSIS — Z79.899 HIGH RISK MEDICATION USE: ICD-10-CM

## 2021-07-08 LAB
ALBUMIN SERPL-MCNC: 3.6 G/DL (ref 3.4–5)
ALP SERPL-CCNC: 39 U/L (ref 40–150)
ALT SERPL W P-5'-P-CCNC: 23 U/L (ref 0–50)
AST SERPL W P-5'-P-CCNC: 22 U/L (ref 0–45)
BASOPHILS # BLD AUTO: 0 10E9/L (ref 0–0.2)
BASOPHILS NFR BLD AUTO: 0.2 %
BILIRUB DIRECT SERPL-MCNC: 0.2 MG/DL (ref 0–0.2)
BILIRUB SERPL-MCNC: 0.5 MG/DL (ref 0.2–1.3)
CREAT SERPL-MCNC: 0.81 MG/DL (ref 0.52–1.04)
DIFFERENTIAL METHOD BLD: ABNORMAL
EOSINOPHIL # BLD AUTO: 0.1 10E9/L (ref 0–0.7)
EOSINOPHIL NFR BLD AUTO: 1.2 %
ERYTHROCYTE [DISTWIDTH] IN BLOOD BY AUTOMATED COUNT: 15.1 % (ref 10–15)
GFR SERPL CREATININE-BSD FRML MDRD: 67 ML/MIN/{1.73_M2}
HCT VFR BLD AUTO: 35.9 % (ref 35–47)
HGB BLD-MCNC: 12.1 G/DL (ref 11.7–15.7)
LYMPHOCYTES # BLD AUTO: 1.2 10E9/L (ref 0.8–5.3)
LYMPHOCYTES NFR BLD AUTO: 23.7 %
MCH RBC QN AUTO: 28.9 PG (ref 26.5–33)
MCHC RBC AUTO-ENTMCNC: 33.7 G/DL (ref 31.5–36.5)
MCV RBC AUTO: 86 FL (ref 78–100)
MONOCYTES # BLD AUTO: 0.6 10E9/L (ref 0–1.3)
MONOCYTES NFR BLD AUTO: 11.2 %
NEUTROPHILS # BLD AUTO: 3.3 10E9/L (ref 1.6–8.3)
NEUTROPHILS NFR BLD AUTO: 63.7 %
PLATELET # BLD AUTO: 247 10E9/L (ref 150–450)
PROT SERPL-MCNC: 7.1 G/DL (ref 6.8–8.8)
RBC # BLD AUTO: 4.19 10E12/L (ref 3.8–5.2)
WBC # BLD AUTO: 5.1 10E9/L (ref 4–11)

## 2021-07-08 PROCEDURE — 85025 COMPLETE CBC W/AUTO DIFF WBC: CPT | Performed by: INTERNAL MEDICINE

## 2021-07-08 PROCEDURE — 80076 HEPATIC FUNCTION PANEL: CPT | Performed by: INTERNAL MEDICINE

## 2021-07-08 PROCEDURE — 36415 COLL VENOUS BLD VENIPUNCTURE: CPT | Performed by: INTERNAL MEDICINE

## 2021-07-08 PROCEDURE — 82565 ASSAY OF CREATININE: CPT | Performed by: INTERNAL MEDICINE

## 2021-09-04 ENCOUNTER — HEALTH MAINTENANCE LETTER (OUTPATIENT)
Age: 81
End: 2021-09-04

## 2021-10-04 ENCOUNTER — LAB (OUTPATIENT)
Dept: LAB | Facility: CLINIC | Age: 81
End: 2021-10-04
Payer: COMMERCIAL

## 2021-10-04 DIAGNOSIS — Z79.899 HIGH RISK MEDICATION USE: ICD-10-CM

## 2021-10-04 DIAGNOSIS — M05.79 RHEUMATOID ARTHRITIS INVOLVING MULTIPLE SITES WITH POSITIVE RHEUMATOID FACTOR (H): ICD-10-CM

## 2021-10-04 LAB
ALBUMIN SERPL-MCNC: 4.2 G/DL (ref 3.4–5)
ALP SERPL-CCNC: 44 U/L (ref 40–150)
ALT SERPL W P-5'-P-CCNC: 29 U/L (ref 0–50)
AST SERPL W P-5'-P-CCNC: 27 U/L (ref 0–45)
BASOPHILS # BLD AUTO: 0 10E3/UL (ref 0–0.2)
BASOPHILS NFR BLD AUTO: 1 %
BILIRUB DIRECT SERPL-MCNC: 0.1 MG/DL (ref 0–0.2)
BILIRUB SERPL-MCNC: 0.6 MG/DL (ref 0.2–1.3)
CREAT SERPL-MCNC: 0.81 MG/DL (ref 0.52–1.04)
CRP SERPL-MCNC: 5.4 MG/L (ref 0–8)
EOSINOPHIL # BLD AUTO: 0.1 10E3/UL (ref 0–0.7)
EOSINOPHIL NFR BLD AUTO: 2 %
ERYTHROCYTE [DISTWIDTH] IN BLOOD BY AUTOMATED COUNT: 15.1 % (ref 10–15)
ERYTHROCYTE [SEDIMENTATION RATE] IN BLOOD BY WESTERGREN METHOD: 51 MM/HR (ref 0–30)
GFR SERPL CREATININE-BSD FRML MDRD: 67 ML/MIN/1.73M2
HCT VFR BLD AUTO: 39.2 % (ref 35–47)
HGB BLD-MCNC: 13.4 G/DL (ref 11.7–15.7)
LYMPHOCYTES # BLD AUTO: 1.3 10E3/UL (ref 0.8–5.3)
LYMPHOCYTES NFR BLD AUTO: 29 %
MCH RBC QN AUTO: 29.8 PG (ref 26.5–33)
MCHC RBC AUTO-ENTMCNC: 34.2 G/DL (ref 31.5–36.5)
MCV RBC AUTO: 87 FL (ref 78–100)
MONOCYTES # BLD AUTO: 0.4 10E3/UL (ref 0–1.3)
MONOCYTES NFR BLD AUTO: 8 %
NEUTROPHILS # BLD AUTO: 2.7 10E3/UL (ref 1.6–8.3)
NEUTROPHILS NFR BLD AUTO: 61 %
PLATELET # BLD AUTO: 267 10E3/UL (ref 150–450)
PROT SERPL-MCNC: 8.2 G/DL (ref 6.8–8.8)
RBC # BLD AUTO: 4.5 10E6/UL (ref 3.8–5.2)
WBC # BLD AUTO: 4.4 10E3/UL (ref 4–11)

## 2021-10-04 PROCEDURE — 85025 COMPLETE CBC W/AUTO DIFF WBC: CPT

## 2021-10-04 PROCEDURE — 36415 COLL VENOUS BLD VENIPUNCTURE: CPT

## 2021-10-04 PROCEDURE — 85652 RBC SED RATE AUTOMATED: CPT

## 2021-10-04 PROCEDURE — 82565 ASSAY OF CREATININE: CPT

## 2021-10-04 PROCEDURE — 80076 HEPATIC FUNCTION PANEL: CPT

## 2021-10-04 PROCEDURE — 86140 C-REACTIVE PROTEIN: CPT

## 2021-10-07 ENCOUNTER — OFFICE VISIT (OUTPATIENT)
Dept: RHEUMATOLOGY | Facility: CLINIC | Age: 81
End: 2021-10-07
Payer: COMMERCIAL

## 2021-10-07 VITALS
HEIGHT: 62 IN | BODY MASS INDEX: 26.02 KG/M2 | HEART RATE: 64 BPM | SYSTOLIC BLOOD PRESSURE: 134 MMHG | WEIGHT: 141.4 LBS | OXYGEN SATURATION: 100 % | DIASTOLIC BLOOD PRESSURE: 79 MMHG

## 2021-10-07 DIAGNOSIS — Z79.899 HIGH RISK MEDICATION USE: ICD-10-CM

## 2021-10-07 DIAGNOSIS — M05.79 RHEUMATOID ARTHRITIS INVOLVING MULTIPLE SITES WITH POSITIVE RHEUMATOID FACTOR (H): Primary | ICD-10-CM

## 2021-10-07 PROCEDURE — 99214 OFFICE O/P EST MOD 30 MIN: CPT | Performed by: INTERNAL MEDICINE

## 2021-10-07 RX ORDER — METHOTREXATE 2.5 MG/1
17.5 TABLET ORAL WEEKLY
Qty: 91 TABLET | Refills: 1 | Status: SHIPPED | OUTPATIENT
Start: 2021-10-07 | End: 2022-04-07

## 2021-10-07 RX ORDER — FOLIC ACID 1 MG/1
1 TABLET ORAL DAILY
Qty: 90 TABLET | Refills: 2 | Status: SHIPPED | OUTPATIENT
Start: 2021-10-07 | End: 2022-04-07

## 2021-10-07 ASSESSMENT — MIFFLIN-ST. JEOR: SCORE: 1049.64

## 2021-10-07 NOTE — PROGRESS NOTES
Rheumatology Clinic Visit      Nelda Palma MRN# [id number]   YOB: 1940 Age: 81 year old      Date of visit: 10/07/21   PCP: Dr. Shawn Bradley at the [facility]  Endocrinologist: Dr. Aditya De Leon at [facility].     Chief Complaint   Patient presents with:  Rheumatoid Arthritis: Doing good    Assessment and Plan     1.  Seropositive erosive rheumatoid arthritis (, CCP >2776.8): Reportedly diagnosed in 2004.  Reportedly on hydroxychloroquine in the past that was effective and slowly tapered off.  Note that the patient reports her ophthalmologist telling her that Plaquenil can be very toxic to her eyes and the patient is under the impression that she is not supposed to restart Plaquenil.  Ophthalmology records available for review did not document a contraindication to using Plaquenil.  Regardless, she has erosive disease so methotrexate was started 2/15/2019 with significant improvement.   2/6/2020: Mild synovitis across the MCPs so MTX was increased with resolution of the inflammatory symptoms.  Doing well at this time; except for occasional mild gelling phenomenon affecting the knees and an elevated ESR; no synovitis on exam.  Degenerative changes at the DIPs.  She is happy with how well her arthritis is controlled at this time.  Continue on methotrexate monotherapy.  If needed in the future may consider methotrexate dosage increase as it has been tolerated and is effective.  Chronic illness, stable.    - Continue methotrexate 17.5 mg once weekly   - Continue folic acid 1mg daily  - Labs in 3 months: CBC, Creatinine, Hepatic Panel  - Labs in 6 months: CBC, Creatinine, Hepatic Panel, ESR, CRP     High risk medication requiring intensive toxicity monitoring at least quarterly: labs ordered include CBC, Creatinine, Hepatic panel to monitor for cytopenia and hepatotoxicity; checking creatinine as it affects clearance of medication.      2. Neck pain in the setting of RA,  history: reportedly a chronic issue for years.  No instability or degenerative change seen on 2/14/2019 x-ray.  Significant improvement with physical therapy exercises.  Not an issue today.    3. Bilateral knee pain: Patellofemoral syndrome.  Significant improvement with physical therapy exercises.  Also see #1    4. Bilateral hand osteoarthritis: Stable    5. Osteoporosis: Managed by her endocrinologist, Dr. Aditya De Leon at [facility].     # s/p 2 doses of the Moderna COVID19 vaccine    A single additional dose of the Pfizer or Moderna COVID-19 vaccine is recommended at least 28 days after the completion of the 2-dose mRNA vaccine series for patients receiving any immunosuppressive or immunomodulatory therapy. Attempts should be made to match the additional mRNA dose type to the type given in the mRNA primary series; however, if that is not feasible, a booster dose with the alternative mRNA vaccine is permitted.    Based on our current understanding (and this may change over time as we learn more), medications should be adjusted as noted below, if disease activity allows:  - NSAIDs and Acetaminophen: hold for 24 hours prior to vaccination if able to do so  - Methotrexate should be held for 1-2 weeks after the mRNA COVID-19 booster vaccine     Total minutes spent in evaluation with patient, documentation, , and review of pertinent studies and chart notes: 14      Ms. Palma verbalized agreement with and understanding of the rational for the diagnosis and treatment plan.  All questions were answered to best of my ability and the patient's satisfaction. Ms. Palma was advised to contact the clinic with any questions that may arise after the clinic visit.      Thank you for involving me in the care of the patient    Return to clinic: 6 months      HPI   Nelda Palma is a 81 year old female with a past medical history significant for hypertension, left TKA, migraines, rosacea, bilateral cataract surgery in 2009,  hypothyroidism, senile osteoporosis, and rheumatoid arthritis who is seen for follow-up of RA.      Today, 10/7/2021: doing well at this time.  No joint pain or swelling.  Morning stiffness for no more than 10 minutes.  Positive gelling phenomenon only affecting her knees after a long walk, but not after every time she goes walking.  Tolerating methotrexate well.  Arthritis is not limiting her daily activities.  She says that she is very happy with how well her arthritis is controlled at this time.  Tolerating methotrexate well.    Denies fevers, chills, nausea, vomiting, constipation, diarrhea. No abdominal pain. No chest pain/pressure, palpitations, or shortness of breath. No LE swelling. No oral or nasal sores.  No rash. No sicca symptoms.     Tobacco: none  EtOH: none  Drugs: none  Occupation: [employer], now retired    ROS   12 point review of system was completed and negative except as noted in the HPI     Active Problem List     Patient Active Problem List   Diagnosis     Actinic keratosis     Essential hypertension     GA (granuloma annulare)     Headache     Hypothyroidism     Meningioma (H)     Rheumatoid arthritis involving both hands with positive rheumatoid factor (H)     Rosacea     Senile osteoporosis     Sensorineural hearing loss, bilateral     Neck pain     Past Medical History   See above    Past Surgical History   Left TKA    Allergy     Allergies   Allergen Reactions     Oxycodone-Acetaminophen Anxiety     After TKA she developed marked post operative anxiety felt to be due to the Percocet; took it again after going home and symptoms recurred.     Penicillins Rash     Current Medication List     Current Outpatient Medications   Medication Sig     CALCIUM CITRATE-VITAMIN D PO      folic acid (FOLVITE) 1 MG tablet Take 1 tablet (1 mg) by mouth daily     hydrochlorothiazide (HYDRODIURIL) 25 MG tablet Take 25 mg by mouth daily     levothyroxine (SYNTHROID/LEVOTHROID) 88 MCG tablet  "Take 88 mcg by mouth daily 75 mg     losartan (COZAAR) 50 MG tablet Take 75 mg by mouth daily     magnesium 250 MG tablet Take 1 tablet by mouth daily     methotrexate sodium 2.5 MG TABS Take 7 tablets (17.5 mg) by mouth once a week . Take all 7 tablets on the same day of each week.     Vitamin D, Cholecalciferol, 400 units TABS      diclofenac (VOLTAREN) 1 % topical gel Apply up to 4 times per day to the hands as needed; no more than 2 grams per application to the upper extremities. (Patient not taking: Reported on 10/10/2019)     diclofenac (VOLTAREN) 75 MG EC tablet Take 75 mg by mouth 2 times daily     No current facility-administered medications for this visit.     Social History   See HPI    Family History     Family History   Problem Relation Age of Onset     Diabetes Mother      Diabetes Father        Physical Exam     Temp Readings from Last 3 Encounters:   06/06/19 97.9  F (36.6  C) (Oral)   02/14/19 96.3  F (35.7  C) (Oral)     BP Readings from Last 5 Encounters:   10/07/21 134/79   04/07/21 (!) 179/80   02/06/20 132/89   10/10/19 116/80   06/06/19 122/77     Pulse Readings from Last 1 Encounters:   10/07/21 64     Resp Readings from Last 1 Encounters:   No data found for Resp     Estimated body mass index is 25.86 kg/m  as calculated from the following:    Height as of this encounter: 1.575 m (5' 2\").    Weight as of this encounter: 64.1 kg (141 lb 6.4 oz).      GEN: NAD. Healthy appearing adult.   HEENT:  Anicteric, noninjected sclera. No obvious external lesions of the ear and nose. Hearing intact.  CV: S1, S2.  RRR.  No murmurs or rubs  PULM: No increased work of breathing.  Clear to auscultation bilaterally  MSK: Chronic synovial hypertrophy at the right second and third MCPs without to palpation, increased warmth, or overlying erythema.  Other MCPs without swelling or tenderness to palpation.  PIPs, DIPs without swelling or tenderness to palpation.  Wrists without swelling or tenderness to " palpation.  Elbows and shoulders without swelling or tenderness to palpation.  Knees, ankles, and MTPs without swelling or tenderness to palpation.    SKIN: No rash or jaundice seen  PSYCH: Alert. Appropriate.        Labs / Imaging (select studies)       CBC  Recent Labs   Lab Test 10/04/21  0913 07/08/21  0914 04/07/21  1316 09/25/20  0921 09/25/20  0921   WBC 4.4 5.1 7.5   < > 6.6   RBC 4.50 4.19 4.18   < > 4.49   HGB 13.4 12.1 12.2   < > 13.0   HCT 39.2 35.9 36.5   < > 39.1   MCV 87 86 87   < > 87   RDW 15.1* 15.1* 14.9   < > 15.5*    247 303   < > 268   MCH 29.8 28.9 29.2   < > 29.0   MCHC 34.2 33.7 33.4   < > 33.2   NEUTROPHIL 61 63.7 68.2   < > 68.2   LYMPH 29 23.7 20.6   < > 22.3   MONOCYTE 8 11.2 9.7   < > 8.1   EOSINOPHIL 2 1.2 1.2   < > 1.1   BASOPHIL 1 0.2 0.3   < > 0.3   ANEU  --  3.3 5.2  --  4.5   ALYM  --  1.2 1.6  --  1.5   NGOC  --  0.6 0.7  --  0.5   AEOS  --  0.1 0.1  --  0.1   ABAS  --  0.0 0.0  --  0.0   ANEUTAUTO 2.7  --   --   --   --    ALYMPAUTO 1.3  --   --   --   --    AMONOAUTO 0.4  --   --   --   --    AEOSAUTO 0.1  --   --   --   --    ABSBASO 0.0  --   --   --   --     < > = values in this interval not displayed.     CMP  Recent Labs   Lab Test 10/04/21  0913 07/08/21  0914 04/07/21  1316 09/25/20  0921 09/25/20  0921   CR 0.81 0.81 0.84   < > 0.86   GFRESTIMATED 67 67 65   < > 63   GFRESTBLACK  --  78 75  --  73   BILITOTAL 0.6 0.5 0.4   < > 0.5   ALBUMIN 4.2 3.6 3.6   < > 3.8   PROTTOTAL 8.2 7.1 7.2   < > 7.6   ALKPHOS 44 39* 38*   < > 36*   AST 27 22 22   < > 22   ALT 29 23 26   < > 22    < > = values in this interval not displayed.     ESR/CRP  Recent Labs   Lab Test 10/04/21  0913 04/07/21  1316 09/25/20  0921   SED 51* 36* 29   CRP 5.4 <2.9 <2.9     Hepatitis B  Recent Labs   Lab Test 09/25/20  0921 10/10/19  1118   HBCAB Nonreactive Nonreactive   HEPBANG Nonreactive  --      Immunization History     Immunization History   Administered Date(s) Administered      COVID-19,PF,Moderna 02/25/2021, 03/25/2021          Chart documentation done in part with Dragon Voice recognition Software. Although reviewed after completion, some word and grammatical error may remain.      Phill Villa MD

## 2021-10-07 NOTE — PATIENT INSTRUCTIONS
RHEUMATOLOGY    Dr. Phill Villa    [facility] [place]  [address] [place] [address]  Phone number: [phone]  Fax number: [phone]    Thank you for choosing [facility]!    Abi Sauceda CMA Rheumatology    -------------------    A single additional dose of the Pfizer or Moderna COVID-19 vaccine is recommended at least 28 days after the completion of the 2-dose mRNA vaccine series for patients receiving any immunosuppressive or immunomodulatory therapy. Attempts should be made to match the additional mRNA dose type to the type given in the mRNA primary series; however, if that is not feasible, a booster dose with the alternative mRNA vaccine is permitted.    Based on our current understanding (and this may change over time as we learn more), medications should be adjusted as noted below, if disease activity allows:  - NSAIDs and Acetaminophen: hold for 24 hours prior to vaccination if able to do so  - Methotrexate should be held for 1-2 weeks after the mRNA COVID-19 booster vaccine

## 2021-10-07 NOTE — NURSING NOTE
RAPID3 (0-30) Cumulative Score  1.3          RAPID3 Weighted Score (divide #4 by 3 and that is the weighted score)  0.4

## 2021-11-22 NOTE — TELEPHONE ENCOUNTER
Spoke with patient and informed her of Dr. Villa's message below. Patient verbalized understanding however is very hesitant about starting methotrexate. She reports she is feeling well at the moment and is concerned about the potential side effects. RN reviewed common side effects and discussed that folic acid helps to lessen side effects. Patient is still somewhat worried and needs to think about what she wants to do.     Patient also states [facility], Phone: [phone]. Need to fax lab orders there.    Osei Rodriguez RN....2/18/2019 12:36 PM        PAST MEDICAL HISTORY:  Anxiety     Depression     MRSA (methicillin resistant Staphylococcus aureus) left breast 2020    Ruptured cyst of ovary

## 2022-01-17 ENCOUNTER — TELEPHONE (OUTPATIENT)
Dept: RHEUMATOLOGY | Facility: CLINIC | Age: 82
End: 2022-01-17
Payer: COMMERCIAL

## 2022-01-17 NOTE — TELEPHONE ENCOUNTER
[facility] Call Center    Phone Message    May a detailed message be left on voicemail: yes     Reason for Call: Other: Pt would like to talk to someone. She doesn't remember if she took her methotrexate on Saturday and is wondering if she should take it today or not.  Please call pt at [phone].     Action Taken: Message routed to:  Other: [facility]     Travel Screening: Not Applicable

## 2022-01-19 ENCOUNTER — LAB (OUTPATIENT)
Dept: LAB | Facility: CLINIC | Age: 82
End: 2022-01-19
Payer: COMMERCIAL

## 2022-01-19 DIAGNOSIS — Z79.899 HIGH RISK MEDICATION USE: ICD-10-CM

## 2022-01-19 DIAGNOSIS — M05.79 RHEUMATOID ARTHRITIS INVOLVING MULTIPLE SITES WITH POSITIVE RHEUMATOID FACTOR (H): ICD-10-CM

## 2022-01-19 LAB
ALBUMIN SERPL-MCNC: 4 G/DL (ref 3.4–5)
ALP SERPL-CCNC: 38 U/L (ref 40–150)
ALT SERPL W P-5'-P-CCNC: 27 U/L (ref 0–50)
AST SERPL W P-5'-P-CCNC: 20 U/L (ref 0–45)
BASOPHILS # BLD AUTO: 0 10E3/UL (ref 0–0.2)
BASOPHILS NFR BLD AUTO: 0 %
BILIRUB DIRECT SERPL-MCNC: 0.2 MG/DL (ref 0–0.2)
BILIRUB SERPL-MCNC: 0.6 MG/DL (ref 0.2–1.3)
CREAT SERPL-MCNC: 0.79 MG/DL (ref 0.52–1.04)
CRP SERPL-MCNC: <2.9 MG/L (ref 0–8)
EOSINOPHIL # BLD AUTO: 0.1 10E3/UL (ref 0–0.7)
EOSINOPHIL NFR BLD AUTO: 1 %
ERYTHROCYTE [DISTWIDTH] IN BLOOD BY AUTOMATED COUNT: 15.5 % (ref 10–15)
ERYTHROCYTE [SEDIMENTATION RATE] IN BLOOD BY WESTERGREN METHOD: 29 MM/HR (ref 0–30)
GFR SERPL CREATININE-BSD FRML MDRD: 74 ML/MIN/1.73M2
HCT VFR BLD AUTO: 39.9 % (ref 35–47)
HGB BLD-MCNC: 12.9 G/DL (ref 11.7–15.7)
LYMPHOCYTES # BLD AUTO: 1.7 10E3/UL (ref 0.8–5.3)
LYMPHOCYTES NFR BLD AUTO: 23 %
MCH RBC QN AUTO: 28.9 PG (ref 26.5–33)
MCHC RBC AUTO-ENTMCNC: 32.3 G/DL (ref 31.5–36.5)
MCV RBC AUTO: 90 FL (ref 78–100)
MONOCYTES # BLD AUTO: 0.7 10E3/UL (ref 0–1.3)
MONOCYTES NFR BLD AUTO: 10 %
NEUTROPHILS # BLD AUTO: 4.7 10E3/UL (ref 1.6–8.3)
NEUTROPHILS NFR BLD AUTO: 65 %
PLATELET # BLD AUTO: 275 10E3/UL (ref 150–450)
PROT SERPL-MCNC: 7.9 G/DL (ref 6.8–8.8)
RBC # BLD AUTO: 4.46 10E6/UL (ref 3.8–5.2)
WBC # BLD AUTO: 7.2 10E3/UL (ref 4–11)

## 2022-01-19 PROCEDURE — 85652 RBC SED RATE AUTOMATED: CPT

## 2022-01-19 PROCEDURE — 36415 COLL VENOUS BLD VENIPUNCTURE: CPT

## 2022-01-19 PROCEDURE — 80076 HEPATIC FUNCTION PANEL: CPT

## 2022-01-19 PROCEDURE — 86140 C-REACTIVE PROTEIN: CPT

## 2022-01-19 PROCEDURE — 82565 ASSAY OF CREATININE: CPT

## 2022-01-19 PROCEDURE — 85025 COMPLETE CBC W/AUTO DIFF WBC: CPT

## 2022-04-05 ENCOUNTER — LAB (OUTPATIENT)
Dept: LAB | Facility: CLINIC | Age: 82
End: 2022-04-05
Payer: COMMERCIAL

## 2022-04-05 DIAGNOSIS — Z79.899 HIGH RISK MEDICATION USE: ICD-10-CM

## 2022-04-05 DIAGNOSIS — M05.79 RHEUMATOID ARTHRITIS INVOLVING MULTIPLE SITES WITH POSITIVE RHEUMATOID FACTOR (H): ICD-10-CM

## 2022-04-05 LAB
ALBUMIN SERPL-MCNC: 3.7 G/DL (ref 3.4–5)
ALP SERPL-CCNC: 39 U/L (ref 40–150)
ALT SERPL W P-5'-P-CCNC: 32 U/L (ref 0–50)
AST SERPL W P-5'-P-CCNC: 28 U/L (ref 0–45)
BASOPHILS # BLD AUTO: 0 10E3/UL (ref 0–0.2)
BASOPHILS NFR BLD AUTO: 0 %
BILIRUB DIRECT SERPL-MCNC: 0.2 MG/DL (ref 0–0.2)
BILIRUB SERPL-MCNC: 0.6 MG/DL (ref 0.2–1.3)
CREAT SERPL-MCNC: 0.82 MG/DL (ref 0.52–1.04)
CRP SERPL-MCNC: <2.9 MG/L (ref 0–8)
EOSINOPHIL # BLD AUTO: 0.1 10E3/UL (ref 0–0.7)
EOSINOPHIL NFR BLD AUTO: 1 %
ERYTHROCYTE [DISTWIDTH] IN BLOOD BY AUTOMATED COUNT: 15.5 % (ref 10–15)
ERYTHROCYTE [SEDIMENTATION RATE] IN BLOOD BY WESTERGREN METHOD: 33 MM/HR (ref 0–30)
GFR SERPL CREATININE-BSD FRML MDRD: 71 ML/MIN/1.73M2
HCT VFR BLD AUTO: 37.3 % (ref 35–47)
HGB BLD-MCNC: 12.3 G/DL (ref 11.7–15.7)
LYMPHOCYTES # BLD AUTO: 1.5 10E3/UL (ref 0.8–5.3)
LYMPHOCYTES NFR BLD AUTO: 21 %
MCH RBC QN AUTO: 29.3 PG (ref 26.5–33)
MCHC RBC AUTO-ENTMCNC: 33 G/DL (ref 31.5–36.5)
MCV RBC AUTO: 89 FL (ref 78–100)
MONOCYTES # BLD AUTO: 0.8 10E3/UL (ref 0–1.3)
MONOCYTES NFR BLD AUTO: 12 %
NEUTROPHILS # BLD AUTO: 4.6 10E3/UL (ref 1.6–8.3)
NEUTROPHILS NFR BLD AUTO: 66 %
PLATELET # BLD AUTO: 237 10E3/UL (ref 150–450)
PROT SERPL-MCNC: 7.5 G/DL (ref 6.8–8.8)
RBC # BLD AUTO: 4.2 10E6/UL (ref 3.8–5.2)
WBC # BLD AUTO: 6.9 10E3/UL (ref 4–11)

## 2022-04-05 PROCEDURE — 36415 COLL VENOUS BLD VENIPUNCTURE: CPT

## 2022-04-05 PROCEDURE — 85652 RBC SED RATE AUTOMATED: CPT

## 2022-04-05 PROCEDURE — 85025 COMPLETE CBC W/AUTO DIFF WBC: CPT

## 2022-04-05 PROCEDURE — 82565 ASSAY OF CREATININE: CPT

## 2022-04-05 PROCEDURE — 80076 HEPATIC FUNCTION PANEL: CPT

## 2022-04-05 PROCEDURE — 86140 C-REACTIVE PROTEIN: CPT

## 2022-04-07 ENCOUNTER — OFFICE VISIT (OUTPATIENT)
Dept: RHEUMATOLOGY | Facility: CLINIC | Age: 82
End: 2022-04-07
Payer: COMMERCIAL

## 2022-04-07 VITALS
BODY MASS INDEX: 26.74 KG/M2 | HEART RATE: 68 BPM | WEIGHT: 146.2 LBS | OXYGEN SATURATION: 99 % | SYSTOLIC BLOOD PRESSURE: 143 MMHG | DIASTOLIC BLOOD PRESSURE: 84 MMHG

## 2022-04-07 DIAGNOSIS — Z79.899 HIGH RISK MEDICATION USE: ICD-10-CM

## 2022-04-07 DIAGNOSIS — M05.79 RHEUMATOID ARTHRITIS INVOLVING MULTIPLE SITES WITH POSITIVE RHEUMATOID FACTOR (H): Primary | ICD-10-CM

## 2022-04-07 PROCEDURE — 99214 OFFICE O/P EST MOD 30 MIN: CPT | Performed by: INTERNAL MEDICINE

## 2022-04-07 RX ORDER — FOLIC ACID 1 MG/1
1 TABLET ORAL DAILY
Qty: 90 TABLET | Refills: 2 | Status: SHIPPED | OUTPATIENT
Start: 2022-04-07 | End: 2023-01-12

## 2022-04-07 RX ORDER — METHOTREXATE 2.5 MG/1
17.5 TABLET ORAL WEEKLY
Qty: 91 TABLET | Refills: 1 | Status: SHIPPED | OUTPATIENT
Start: 2022-04-07 | End: 2022-10-14

## 2022-04-07 NOTE — PATIENT INSTRUCTIONS
A 4th dose of the mRNA COVID-19 vaccination is recommended to be received 3 months after the third mRNA COVID-19 vaccination was received.  Based on our current understanding (and this may change over time as we learn more), medications should be adjusted as noted below, if disease activity allows:  - NSAIDs and Acetaminophen: hold for 24 hours prior to vaccination if able to do so  - Methotrexate should be held for 2 weeks after the mRNA COVID-19 vaccine    
done

## 2022-04-07 NOTE — PROGRESS NOTES
Rheumatology Clinic Visit      Nelda Palma MRN# [id number]   YOB: 1940 Age: 81 year old      Date of visit: 4/07/22   PCP: Dr. Shawn Bradley at the [facility]  Endocrinologist: Dr. Aditya De Leon at [facility].     Chief Complaint   Patient presents with:  RECHECK    Assessment and Plan     1.  Seropositive erosive rheumatoid arthritis (, CCP >2776.8): Reportedly diagnosed in 2004.  Reportedly on hydroxychloroquine in the past that was effective and slowly tapered off.  Note that the patient reports her ophthalmologist telling her that Plaquenil can be very toxic to her eyes and the patient is under the impression that she is not supposed to restart Plaquenil.  Ophthalmology records available for review did not document a contraindication to using Plaquenil.  Regardless, she has erosive disease so methotrexate was started 2/15/2019 with significant improvement.   2/6/2020: Mild synovitis across the MCPs so MTX was increased with resolution of the inflammatory symptoms.  Doing well at this time.  If needed in the future may consider methotrexate dosage increase as it has been tolerated and is effective.  Chronic illness, stable.    - Continue methotrexate 17.5 mg once weekly   - Continue folic acid 1mg daily  - Labs in 3 months: CBC, Creatinine, Hepatic Panel, HCV ab  - Labs in 6 months: CBC, Creatinine, Hepatic Panel, ESR, CRP     High risk medication requiring intensive toxicity monitoring at least quarterly: labs ordered include CBC, Creatinine, Hepatic panel to monitor for cytopenia and hepatotoxicity; checking creatinine as it affects clearance of medication.      2. Neck pain in the setting of RA, history: reportedly a chronic issue for years.  No instability or degenerative change seen on 2/14/2019 x-ray.  Significant improvement with physical therapy exercises that she continues to do at home    3. Bilateral knee pain: Patellofemoral syndrome.  Significant improvement  with physical therapy exercises.  Also see #1    4. Bilateral hand osteoarthritis: Stable    5. Osteoporosis: Managed by her endocrinologist, Dr. Aditya De Leon at [facility].     # s/p Moderna vaccine, received 2/25/2021, 3/25/2021, 11/12/2021.  A 4th dose of the mRNA COVID-19 vaccination is recommended to be received 3 months after the third mRNA COVID-19 vaccination was received.  Based on our current understanding (and this may change over time as we learn more), medications should be adjusted as noted below, if disease activity allows:  - NSAIDs and Acetaminophen: hold for 24 hours prior to vaccination if able to do so  - Methotrexate should be held for 2 weeks after the mRNA COVID-19 vaccine        Total minutes spent in evaluation with patient, documentation, , and review of pertinent studies and chart notes: 16     Ms. Palma verbalized agreement with and understanding of the rational for the diagnosis and treatment plan.  All questions were answered to best of my ability and the patient's satisfaction. Ms. Palma was advised to contact the clinic with any questions that may arise after the clinic visit.      Thank you for involving me in the care of the patient    Return to clinic: 6 months      HPI   Nelda Palma is a 81 year old female with a past medical history significant for hypertension, left TKA, migraines, rosacea, bilateral cataract surgery in 2009, hypothyroidism, senile osteoporosis, and rheumatoid arthritis who is seen for follow-up of RA.      Today, 4/7/2022: RA controlled.  Tolerating methotrexate well. No joint pain or swelling.  Morning stiffness for no more than 10 minutes.  Positive gelling phenomenon that is mild and resolves quickly.  Overall very happy with how well she is doing.    Denies fevers, chills, nausea, vomiting, constipation, diarrhea. No abdominal pain. No chest pain/pressure, palpitations, or shortness of breath. No LE swelling. No oral or nasal sores.  No rash. No  sicca symptoms.     Tobacco: none  EtOH: none  Drugs: none  Occupation: [employer]er [employer], now retired    ROS   12 point review of system was completed and negative except as noted in the HPI     Active Problem List     Patient Active Problem List   Diagnosis     Actinic keratosis     Essential hypertension     GA (granuloma annulare)     Headache     Hypothyroidism     Meningioma (H)     Rheumatoid arthritis involving both hands with positive rheumatoid factor (H)     Rosacea     Senile osteoporosis     Sensorineural hearing loss, bilateral     Neck pain     Past Medical History   See above    Past Surgical History   Left TKA    Allergy     Allergies   Allergen Reactions     Oxycodone-Acetaminophen Anxiety     After TKA she developed marked post operative anxiety felt to be due to the Percocet; took it again after going home and symptoms recurred.     Penicillins Rash     Current Medication List     Current Outpatient Medications   Medication Sig     CALCIUM CITRATE-VITAMIN D PO      diclofenac (VOLTAREN) 1 % topical gel Apply up to 4 times per day to the hands as needed; no more than 2 grams per application to the upper extremities.     folic acid (FOLVITE) 1 MG tablet Take 1 tablet (1 mg) by mouth daily     hydrochlorothiazide (HYDRODIURIL) 25 MG tablet Take 25 mg by mouth daily     levothyroxine (SYNTHROID/LEVOTHROID) 88 MCG tablet Take 88 mcg by mouth daily 75 mg     losartan (COZAAR) 50 MG tablet Take 75 mg by mouth daily     magnesium 250 MG tablet Take 1 tablet by mouth daily     methotrexate sodium 2.5 MG TABS Take 7 tablets (17.5 mg) by mouth once a week . Take all 7 tablets on the same day of each week.     Vitamin D, Cholecalciferol, 400 units TABS      diclofenac (VOLTAREN) 75 MG EC tablet Take 75 mg by mouth 2 times daily     No current facility-administered medications for this visit.     Social History   See HPI    Family History     Family History   Problem Relation Age of Onset      "Diabetes Mother      Diabetes Father        Physical Exam     Temp Readings from Last 3 Encounters:   06/06/19 97.9  F (36.6  C) (Oral)   02/14/19 96.3  F (35.7  C) (Oral)     BP Readings from Last 5 Encounters:   10/07/21 134/79   04/07/21 (!) 179/80   02/06/20 132/89   10/10/19 116/80   06/06/19 122/77     Pulse Readings from Last 1 Encounters:   10/07/21 64     Resp Readings from Last 1 Encounters:   No data found for Resp     Estimated body mass index is 25.86 kg/m  as calculated from the following:    Height as of 10/7/21: 1.575 m (5' 2\").    Weight as of 10/7/21: 64.1 kg (141 lb 6.4 oz).     GEN: NAD. Healthy appearing adult.   HEENT:  Anicteric, noninjected sclera. No obvious external lesions of the ear and nose. Hearing intact.  CV: S1, S2.  RRR.  No murmurs or rubs  PULM: No increased work of breathing.  Clear to auscultation bilaterally  MSK: Chronic synovial hypertrophy at the right second and third MCPs without tenderness to palpation, increased warmth, or overlying erythema.  Other MCPs without swelling or tenderness to palpation.  PIPs, DIPs without swelling or tenderness to palpation.  Wrists without swelling or tenderness to palpation.  Elbows and shoulders without swelling or tenderness to palpation.  Knees, ankles, and MTPs without swelling or tenderness to palpation.    SKIN: No rash or jaundice seen  PSYCH: Alert. Appropriate.      Labs / Imaging (select studies)       CBC  Recent Labs   Lab Test 04/05/22  1019 01/19/22  1035 10/04/21  0913 07/08/21  0914 04/07/21  1316 09/25/20  0921   WBC 6.9 7.2 4.4 5.1 7.5 6.6   RBC 4.20 4.46 4.50 4.19 4.18 4.49   HGB 12.3 12.9 13.4 12.1 12.2 13.0   HCT 37.3 39.9 39.2 35.9 36.5 39.1   MCV 89 90 87 86 87 87   RDW 15.5* 15.5* 15.1* 15.1* 14.9 15.5*    275 267 247 303 268   MCH 29.3 28.9 29.8 28.9 29.2 29.0   MCHC 33.0 32.3 34.2 33.7 33.4 33.2   NEUTROPHIL 66 65 61 63.7 68.2 68.2   LYMPH 21 23 29 23.7 20.6 22.3   MONOCYTE 12 10 8 11.2 9.7 8.1   EOSINOPHIL " 1 1 2 1.2 1.2 1.1   BASOPHIL 0 0 1 0.2 0.3 0.3   ANEU  --   --   --  3.3 5.2 4.5   ALYM  --   --   --  1.2 1.6 1.5   NGOC  --   --   --  0.6 0.7 0.5   AEOS  --   --   --  0.1 0.1 0.1   ABAS  --   --   --  0.0 0.0 0.0   ANEUTAUTO 4.6 4.7 2.7  --   --   --    ALYMPAUTO 1.5 1.7 1.3  --   --   --    AMONOAUTO 0.8 0.7 0.4  --   --   --    AEOSAUTO 0.1 0.1 0.1  --   --   --    ABSBASO 0.0 0.0 0.0  --   --   --      CMP  Recent Labs   Lab Test 04/05/22  1019 01/19/22  1035 10/04/21  0913 07/08/21  0914 04/07/21  1316 09/25/20  0921   CR 0.82 0.79 0.81 0.81 0.84 0.86   GFRESTIMATED 71 74 67 67 65 63   GFRESTBLACK  --   --   --  78 75 73   BILITOTAL 0.6 0.6 0.6 0.5 0.4 0.5   ALBUMIN 3.7 4.0 4.2 3.6 3.6 3.8   PROTTOTAL 7.5 7.9 8.2 7.1 7.2 7.6   ALKPHOS 39* 38* 44 39* 38* 36*   AST 28 20 27 22 22 22   ALT 32 27 29 23 26 22     ESR/CRP  Recent Labs   Lab Test 04/05/22  1019 01/19/22  1035 10/04/21  0913   SED 33* 29 51*   CRP <2.9 <2.9 5.4     Hepatitis B  Recent Labs   Lab Test 09/25/20  0921 10/10/19  1118   HBCAB Nonreactive Nonreactive   HEPBANG Nonreactive  --        Immunization History     Immunization History   Administered Date(s) Administered     COVID-19,PF,Moderna 02/25/2021, 03/25/2021, 11/12/2021          Chart documentation done in part with Dragon Voice recognition Software. Although reviewed after completion, some word and grammatical error may remain.      Phill Villa MD

## 2022-04-07 NOTE — NURSING NOTE
Nelda to follow up with Primary Care provider regarding elevated blood pressure.               RAPID3 (0-30) Cumulative Score  2.3          RAPID3 Weighted Score (divide #4 by 3 and that is the weighted score)  0.7

## 2022-06-05 ENCOUNTER — HEALTH MAINTENANCE LETTER (OUTPATIENT)
Age: 82
End: 2022-06-05

## 2022-07-07 ENCOUNTER — LAB (OUTPATIENT)
Dept: LAB | Facility: CLINIC | Age: 82
End: 2022-07-07
Payer: COMMERCIAL

## 2022-07-07 DIAGNOSIS — Z79.899 HIGH RISK MEDICATION USE: ICD-10-CM

## 2022-07-07 DIAGNOSIS — M05.79 RHEUMATOID ARTHRITIS INVOLVING MULTIPLE SITES WITH POSITIVE RHEUMATOID FACTOR (H): ICD-10-CM

## 2022-07-07 LAB
BASOPHILS # BLD AUTO: 0 10E3/UL (ref 0–0.2)
BASOPHILS NFR BLD AUTO: 0 %
EOSINOPHIL # BLD AUTO: 0.1 10E3/UL (ref 0–0.7)
EOSINOPHIL NFR BLD AUTO: 2 %
ERYTHROCYTE [DISTWIDTH] IN BLOOD BY AUTOMATED COUNT: 15.2 % (ref 10–15)
HCT VFR BLD AUTO: 36.7 % (ref 35–47)
HGB BLD-MCNC: 12.1 G/DL (ref 11.7–15.7)
LYMPHOCYTES # BLD AUTO: 1.2 10E3/UL (ref 0.8–5.3)
LYMPHOCYTES NFR BLD AUTO: 24 %
MCH RBC QN AUTO: 29.5 PG (ref 26.5–33)
MCHC RBC AUTO-ENTMCNC: 33 G/DL (ref 31.5–36.5)
MCV RBC AUTO: 90 FL (ref 78–100)
MONOCYTES # BLD AUTO: 0.6 10E3/UL (ref 0–1.3)
MONOCYTES NFR BLD AUTO: 11 %
NEUTROPHILS # BLD AUTO: 3.3 10E3/UL (ref 1.6–8.3)
NEUTROPHILS NFR BLD AUTO: 63 %
PLATELET # BLD AUTO: 237 10E3/UL (ref 150–450)
RBC # BLD AUTO: 4.1 10E6/UL (ref 3.8–5.2)
WBC # BLD AUTO: 5.3 10E3/UL (ref 4–11)

## 2022-07-07 PROCEDURE — 80076 HEPATIC FUNCTION PANEL: CPT

## 2022-07-07 PROCEDURE — 36415 COLL VENOUS BLD VENIPUNCTURE: CPT

## 2022-07-07 PROCEDURE — 85025 COMPLETE CBC W/AUTO DIFF WBC: CPT

## 2022-07-07 PROCEDURE — 86803 HEPATITIS C AB TEST: CPT

## 2022-07-07 PROCEDURE — 82565 ASSAY OF CREATININE: CPT

## 2022-07-08 LAB
ALBUMIN SERPL-MCNC: 3.5 G/DL (ref 3.4–5)
ALP SERPL-CCNC: 29 U/L (ref 40–150)
ALT SERPL W P-5'-P-CCNC: 21 U/L (ref 0–50)
AST SERPL W P-5'-P-CCNC: 20 U/L (ref 0–45)
BILIRUB DIRECT SERPL-MCNC: 0.2 MG/DL (ref 0–0.2)
BILIRUB SERPL-MCNC: 0.6 MG/DL (ref 0.2–1.3)
CREAT SERPL-MCNC: 0.77 MG/DL (ref 0.52–1.04)
GFR SERPL CREATININE-BSD FRML MDRD: 76 ML/MIN/1.73M2
HCV AB SERPL QL IA: NONREACTIVE
PROT SERPL-MCNC: 7 G/DL (ref 6.8–8.8)

## 2022-10-10 ENCOUNTER — LAB (OUTPATIENT)
Dept: LAB | Facility: CLINIC | Age: 82
End: 2022-10-10
Payer: COMMERCIAL

## 2022-10-10 DIAGNOSIS — M05.79 RHEUMATOID ARTHRITIS INVOLVING MULTIPLE SITES WITH POSITIVE RHEUMATOID FACTOR (H): ICD-10-CM

## 2022-10-10 DIAGNOSIS — Z79.899 HIGH RISK MEDICATION USE: ICD-10-CM

## 2022-10-10 LAB
BASOPHILS # BLD AUTO: 0 10E3/UL (ref 0–0.2)
BASOPHILS NFR BLD AUTO: 0 %
CRP SERPL-MCNC: <3 MG/L
EOSINOPHIL # BLD AUTO: 0.1 10E3/UL (ref 0–0.7)
EOSINOPHIL NFR BLD AUTO: 2 %
ERYTHROCYTE [DISTWIDTH] IN BLOOD BY AUTOMATED COUNT: 15.5 % (ref 10–15)
ERYTHROCYTE [SEDIMENTATION RATE] IN BLOOD BY WESTERGREN METHOD: 40 MM/HR (ref 0–30)
HCT VFR BLD AUTO: 35.1 % (ref 35–47)
HGB BLD-MCNC: 11.7 G/DL (ref 11.7–15.7)
LYMPHOCYTES # BLD AUTO: 1.2 10E3/UL (ref 0.8–5.3)
LYMPHOCYTES NFR BLD AUTO: 26 %
MCH RBC QN AUTO: 29.3 PG (ref 26.5–33)
MCHC RBC AUTO-ENTMCNC: 33.3 G/DL (ref 31.5–36.5)
MCV RBC AUTO: 88 FL (ref 78–100)
MONOCYTES # BLD AUTO: 0.5 10E3/UL (ref 0–1.3)
MONOCYTES NFR BLD AUTO: 11 %
NEUTROPHILS # BLD AUTO: 2.8 10E3/UL (ref 1.6–8.3)
NEUTROPHILS NFR BLD AUTO: 61 %
PLATELET # BLD AUTO: 241 10E3/UL (ref 150–450)
RBC # BLD AUTO: 4 10E6/UL (ref 3.8–5.2)
WBC # BLD AUTO: 4.7 10E3/UL (ref 4–11)

## 2022-10-10 PROCEDURE — 85025 COMPLETE CBC W/AUTO DIFF WBC: CPT

## 2022-10-10 PROCEDURE — 86140 C-REACTIVE PROTEIN: CPT

## 2022-10-10 PROCEDURE — 80076 HEPATIC FUNCTION PANEL: CPT

## 2022-10-10 PROCEDURE — 82565 ASSAY OF CREATININE: CPT

## 2022-10-10 PROCEDURE — 85652 RBC SED RATE AUTOMATED: CPT

## 2022-10-10 PROCEDURE — 36415 COLL VENOUS BLD VENIPUNCTURE: CPT

## 2022-10-11 LAB
ALBUMIN SERPL-MCNC: 3.6 G/DL (ref 3.4–5)
ALP SERPL-CCNC: 29 U/L (ref 40–150)
ALT SERPL W P-5'-P-CCNC: 25 U/L (ref 0–50)
AST SERPL W P-5'-P-CCNC: 27 U/L (ref 0–45)
BILIRUB DIRECT SERPL-MCNC: <0.1 MG/DL (ref 0–0.2)
BILIRUB SERPL-MCNC: 0.5 MG/DL (ref 0.2–1.3)
CREAT SERPL-MCNC: 0.79 MG/DL (ref 0.52–1.04)
GFR SERPL CREATININE-BSD FRML MDRD: 73 ML/MIN/1.73M2
PROT SERPL-MCNC: 7.1 G/DL (ref 6.8–8.8)

## 2022-10-14 ENCOUNTER — OFFICE VISIT (OUTPATIENT)
Dept: RHEUMATOLOGY | Facility: CLINIC | Age: 82
End: 2022-10-14
Payer: COMMERCIAL

## 2022-10-14 VITALS
OXYGEN SATURATION: 100 % | HEART RATE: 67 BPM | BODY MASS INDEX: 26.78 KG/M2 | SYSTOLIC BLOOD PRESSURE: 156 MMHG | DIASTOLIC BLOOD PRESSURE: 89 MMHG | WEIGHT: 146.4 LBS

## 2022-10-14 DIAGNOSIS — Z23 NEED FOR PROPHYLACTIC VACCINATION AND INOCULATION AGAINST INFLUENZA: ICD-10-CM

## 2022-10-14 DIAGNOSIS — M05.79 RHEUMATOID ARTHRITIS INVOLVING MULTIPLE SITES WITH POSITIVE RHEUMATOID FACTOR (H): Primary | ICD-10-CM

## 2022-10-14 DIAGNOSIS — Z79.899 HIGH RISK MEDICATION USE: ICD-10-CM

## 2022-10-14 PROCEDURE — 99214 OFFICE O/P EST MOD 30 MIN: CPT | Mod: 25 | Performed by: INTERNAL MEDICINE

## 2022-10-14 PROCEDURE — G0008 ADMIN INFLUENZA VIRUS VAC: HCPCS | Performed by: INTERNAL MEDICINE

## 2022-10-14 PROCEDURE — 90662 IIV NO PRSV INCREASED AG IM: CPT | Performed by: INTERNAL MEDICINE

## 2022-10-14 RX ORDER — LEVOTHYROXINE SODIUM 75 UG/1
TABLET ORAL
COMMUNITY
Start: 2022-07-31

## 2022-10-14 RX ORDER — METHOTREXATE 2.5 MG/1
20 TABLET ORAL WEEKLY
Qty: 96 TABLET | Refills: 1 | Status: SHIPPED | OUTPATIENT
Start: 2022-10-14 | End: 2023-02-09

## 2022-10-14 NOTE — PATIENT INSTRUCTIONS
RHEUMATOLOGY    Dr. Phill Villa    [facility]  [address] [place] [address]  [place] [address]  Phone number: [phone]  Fax number: [phone]    You may schedule your FLU shot by calling [phone] or if you would like to get your shot at a [facility] pharmacy you may schedule online at [email or web address].    Thank you for choosing [facility]!    Abi Sauceda CMA Rheumatology

## 2022-10-14 NOTE — PROGRESS NOTES
Rheumatology Clinic Visit      Nelda Palma MRN# [id number]   YOB: 1940 Age: 82 year old      Date of visit: 10/14/22   PCP: Dr. Shawn Bradley at the [facility]  Endocrinologist: Dr. Aditya De Leon at [facility].     Chief Complaint   Patient presents with:  Rheumatoid Arthritis: Knees are stiffer and her toes bother her everyday    Assessment and Plan     1.  Seropositive erosive rheumatoid arthritis (, CCP >2776.8): Reportedly diagnosed in 2004.  Reportedly on hydroxychloroquine in the past that was effective and slowly tapered off.  Note that the patient reports her ophthalmologist telling her that Plaquenil can be very toxic to her eyes and the patient is under the impression that she is not supposed to restart Plaquenil.  Ophthalmology records available for review did not document a contraindication to using Plaquenil.  Regardless, she has erosive disease so methotrexate was started 2/15/2019 with significant improvement.   2/6/2020: Mild synovitis across the MCPs so MTX was increased with resolution of the inflammatory symptoms previously but now with mild synovitis again at the MCPs.  Again will increase methotrexate as it is tolerated and effective.  Chronic illness, progressive.    - Increase methotrexate from 17.5 mg once weekly, to 20 mg once weekly  - Continue folic acid 1mg daily  - Labs monthly [id number]: CBC, Cr, Hepatic Panel  - Labs in 3 months: CBC, Creatinine, Hepatic Panel, ESR, CRP    High risk medication requiring intensive toxicity monitoring at least quarterly: labs ordered include CBC, Creatinine, Hepatic panel to monitor for cytopenia and hepatotoxicity; checking creatinine as it affects clearance of medication.      2. Neck pain in the setting of RA, history: reportedly a chronic issue for years.  No instability or degenerative change seen on 2/14/2019 x-ray.  Significant improvement with physical therapy exercises that she continues to do at  home    3. Bilateral knee pain: Patellofemoral syndrome.  Significant improvement with physical therapy exercises.  Also see #1    4. Bilateral hand osteoarthritis: Stable    5.  Bilateral toe pain: Inflammatory qualities but no synovitis on exam.  Minimal padding under the MTPs.  Advised stiff soled shoes with good padding, and to wear shoes whenever ambulatory.  Also possibly component of RA and increasing methotrexate as noted in #1.    6. Osteoporosis: Managed by her endocrinologist, Dr. Aditya De Leon at [facility].     7.  Vaccinations: Vaccinations reviewed with Ms. Palma.  Risks and benefits of vaccinations were discussed.    - Influenza: To receive today  - [id number]: up to date  - [id number]: up to date  - Shingrix: Up to date   - COVID-19 advised updating, and to hold methotrexate for 2 weeks afterward    Total minutes spent in evaluation with patient, documentation, , and review of pertinent studies and chart notes: 20     Ms. Palma verbalized agreement with and understanding of the rational for the diagnosis and treatment plan.  All questions were answered to best of my ability and the patient's satisfaction. Ms. Palma was advised to contact the clinic with any questions that may arise after the clinic visit.      Thank you for involving me in the care of the patient    Return to clinic: 3-4 months      HPI   Nelda Palma is a 82 year old female with a past medical history significant for hypertension, left TKA, migraines, rosacea, bilateral cataract surgery in 2009, hypothyroidism, senile osteoporosis, and rheumatoid arthritis who is seen for follow-up of RA.      Today, 10/14/2022: 2 days of increased stiffness at the knees; no pain, increased warmth, or swelling.  Also with some pain and swelling at the MCPs that is worse in the morning and improves with time and activity; morning stiffness for about 1 hour.  Pain at the MCPs that is better with activity, but also better with wearing shoes;  no swelling, increased warmth, or overlying erythema.    Denies fevers, chills, nausea, vomiting, constipation, diarrhea. No abdominal pain. No chest pain/pressure, palpitations, or shortness of breath. No LE swelling. No oral or nasal sores.  No rash. No sicca symptoms.     Tobacco: none  EtOH: none  Drugs: none  Occupation: [employer], now retired    ROS   12 point review of system was completed and negative except as noted in the HPI     Active Problem List     Patient Active Problem List   Diagnosis     Actinic keratosis     Essential hypertension     GA (granuloma annulare)     Headache     Hypothyroidism     Meningioma (H)     Rheumatoid arthritis involving both hands with positive rheumatoid factor (H)     Rosacea     Senile osteoporosis     Sensorineural hearing loss, bilateral     Neck pain     Past Medical History   See above    Past Surgical History   Left TKA    Allergy     Allergies   Allergen Reactions     Oxycodone-Acetaminophen Anxiety     After TKA she developed marked post operative anxiety felt to be due to the Percocet; took it again after going home and symptoms recurred.     Penicillins Rash     Current Medication List     Current Outpatient Medications   Medication Sig     CALCIUM CITRATE-VITAMIN D PO      denosumab (PROLIA) 60 MG/ML SOSY injection Inject 60 mg Subcutaneous     diclofenac (VOLTAREN) 1 % topical gel Apply up to 4 times per day to the hands as needed; no more than 2 grams per application to the upper extremities.     folic acid (FOLVITE) 1 MG tablet Take 1 tablet (1 mg) by mouth daily     hydrochlorothiazide (HYDRODIURIL) 25 MG tablet Take 25 mg by mouth daily     levothyroxine (SYNTHROID/LEVOTHROID) 75 MCG tablet TAKE 1 TABLET (75 MCG) BY MOUTH BEFORE BREAKFAST.     losartan (COZAAR) 50 MG tablet Take 75 mg by mouth daily     magnesium 250 MG tablet Take 1 tablet by mouth daily     methotrexate sodium 2.5 MG TABS Take 7 tablets (17.5 mg) by mouth once a week .  "Take all 7 tablets on the same day of each week.     Vitamin D, Cholecalciferol, 400 units TABS      No current facility-administered medications for this visit.     Social History   See HPI    Family History     Family History   Problem Relation Age of Onset     Diabetes Mother      Diabetes Father        Physical Exam     Temp Readings from Last 3 Encounters:   06/06/19 97.9  F (36.6  C) (Oral)   02/14/19 96.3  F (35.7  C) (Oral)     BP Readings from Last 5 Encounters:   10/14/22 (!) 156/89   04/07/22 (!) 143/84   10/07/21 134/79   04/07/21 (!) 179/80   02/06/20 132/89     Pulse Readings from Last 1 Encounters:   10/14/22 67     Resp Readings from Last 1 Encounters:   No data found for Resp     Estimated body mass index is 26.78 kg/m  as calculated from the following:    Height as of 10/7/21: 1.575 m (5' 2\").    Weight as of this encounter: 66.4 kg (146 lb 6.4 oz).     GEN: NAD.  HEENT:  Anicteric, noninjected sclera. No obvious external lesions of the ear and nose. Hearing intact.  CV: S1, S2.  RRR.  No murmurs or rubs  PULM: No increased work of breathing.  Clear to auscultation bilaterally  MSK: Synovial swelling of the bilateral second and third MCPs that were mildly tender to palpation but no increased warmth or overlying erythema.  Other MCPs without swelling or tenderness to palpation.  PIPs, DIPs without swelling or tenderness to palpation.  Wrists without swelling or tenderness to palpation.  Elbows and shoulders without swelling or tenderness to palpation.  Knees and ankles without swelling or tenderness to palpation.  MTPs diffusely tender to palpation but no swelling, increased warmth, or overlying erythema; minimal padding on the plantar aspect of the MTPs bilaterally.  SKIN: No rash or jaundice seen  PSYCH: Alert. Appropriate.      Labs / Imaging (select studies)       CBC  Recent Labs   Lab Test 10/10/22  1018 07/07/22  1020 04/05/22  1019 10/04/21  0913 07/08/21  0914 04/07/21  1316 09/25/20  0921 "   WBC 4.7 5.3 6.9   < > 5.1 7.5 6.6   RBC 4.00 4.10 4.20   < > 4.19 4.18 4.49   HGB 11.7 12.1 12.3   < > 12.1 12.2 13.0   HCT 35.1 36.7 37.3   < > 35.9 36.5 39.1   MCV 88 90 89   < > 86 87 87   RDW 15.5* 15.2* 15.5*   < > 15.1* 14.9 15.5*    237 237   < > 247 303 268   MCH 29.3 29.5 29.3   < > 28.9 29.2 29.0   MCHC 33.3 33.0 33.0   < > 33.7 33.4 33.2   NEUTROPHIL 61 63 66   < > 63.7 68.2 68.2   LYMPH 26 24 21   < > 23.7 20.6 22.3   MONOCYTE 11 11 12   < > 11.2 9.7 8.1   EOSINOPHIL 2 2 1   < > 1.2 1.2 1.1   BASOPHIL 0 0 0   < > 0.2 0.3 0.3   ANEU  --   --   --   --  3.3 5.2 4.5   ALYM  --   --   --   --  1.2 1.6 1.5   NGOC  --   --   --   --  0.6 0.7 0.5   AEOS  --   --   --   --  0.1 0.1 0.1   ABAS  --   --   --   --  0.0 0.0 0.0   ANEUTAUTO 2.8 3.3 4.6   < >  --   --   --    ALYMPAUTO 1.2 1.2 1.5   < >  --   --   --    AMONOAUTO 0.5 0.6 0.8   < >  --   --   --    AEOSAUTO 0.1 0.1 0.1   < >  --   --   --    ABSBASO 0.0 0.0 0.0   < >  --   --   --     < > = values in this interval not displayed.     CMP  Recent Labs   Lab Test 10/10/22  1018 07/07/22  1020 04/05/22  1019 10/04/21  0913 07/08/21  0914 04/07/21  1316 09/25/20  0921   CR 0.79 0.77 0.82   < > 0.81 0.84 0.86   GFRESTIMATED 73 76 71   < > 67 65 63   GFRESTBLACK  --   --   --   --  78 75 73   BILITOTAL 0.5 0.6 0.6   < > 0.5 0.4 0.5   ALBUMIN 3.6 3.5 3.7   < > 3.6 3.6 3.8   PROTTOTAL 7.1 7.0 7.5   < > 7.1 7.2 7.6   ALKPHOS 29* 29* 39*   < > 39* 38* 36*   AST 27 20 28   < > 22 22 22   ALT 25 21 32   < > 23 26 22    < > = values in this interval not displayed.     ESR/CRP  Recent Labs   Lab Test 10/10/22  1018 04/05/22  1019 01/19/22  1035   SED 40* 33* 29   CRP <3.00 <2.9 <2.9     Hepatitis B  Recent Labs   Lab Test 09/25/20  0921 10/10/19  1118   HBCAB Nonreactive Nonreactive   HEPBANG Nonreactive  --      Hepatitis C  Recent Labs   Lab Test 07/07/22  1020   HCVAB Nonreactive     Immunization History     Immunization History   Administered Date(s)  Administered     COVID-19,PF,Moderna 02/25/2021, 03/25/2021, 11/12/2021     COVID-19,PF,Pfizer 12+ Yrs (2022 and After) 05/05/2022          Chart documentation done in part with Dragon Voice recognition Software. Although reviewed after completion, some word and grammatical error may remain.    Phill Villa MD

## 2022-11-10 ENCOUNTER — LAB (OUTPATIENT)
Dept: LAB | Facility: CLINIC | Age: 82
End: 2022-11-10
Payer: COMMERCIAL

## 2022-11-10 DIAGNOSIS — Z79.899 HIGH RISK MEDICATION USE: ICD-10-CM

## 2022-11-10 DIAGNOSIS — M05.79 RHEUMATOID ARTHRITIS INVOLVING MULTIPLE SITES WITH POSITIVE RHEUMATOID FACTOR (H): ICD-10-CM

## 2022-11-10 LAB
ALBUMIN SERPL-MCNC: 3.5 G/DL (ref 3.4–5)
ALP SERPL-CCNC: 30 U/L (ref 40–150)
ALT SERPL W P-5'-P-CCNC: 22 U/L (ref 0–50)
AST SERPL W P-5'-P-CCNC: 24 U/L (ref 0–45)
BASOPHILS # BLD AUTO: 0 10E3/UL (ref 0–0.2)
BASOPHILS NFR BLD AUTO: 1 %
BILIRUB DIRECT SERPL-MCNC: 0.1 MG/DL (ref 0–0.2)
BILIRUB SERPL-MCNC: 0.5 MG/DL (ref 0.2–1.3)
CREAT SERPL-MCNC: 0.75 MG/DL (ref 0.52–1.04)
EOSINOPHIL # BLD AUTO: 0.1 10E3/UL (ref 0–0.7)
EOSINOPHIL NFR BLD AUTO: 1 %
ERYTHROCYTE [DISTWIDTH] IN BLOOD BY AUTOMATED COUNT: 14.5 % (ref 10–15)
GFR SERPL CREATININE-BSD FRML MDRD: 78 ML/MIN/1.73M2
HCT VFR BLD AUTO: 35.1 % (ref 35–47)
HGB BLD-MCNC: 11.9 G/DL (ref 11.7–15.7)
LYMPHOCYTES # BLD AUTO: 1.4 10E3/UL (ref 0.8–5.3)
LYMPHOCYTES NFR BLD AUTO: 29 %
MCH RBC QN AUTO: 29.5 PG (ref 26.5–33)
MCHC RBC AUTO-ENTMCNC: 33.9 G/DL (ref 31.5–36.5)
MCV RBC AUTO: 87 FL (ref 78–100)
MONOCYTES # BLD AUTO: 0.5 10E3/UL (ref 0–1.3)
MONOCYTES NFR BLD AUTO: 11 %
NEUTROPHILS # BLD AUTO: 2.8 10E3/UL (ref 1.6–8.3)
NEUTROPHILS NFR BLD AUTO: 59 %
PLATELET # BLD AUTO: 266 10E3/UL (ref 150–450)
PROT SERPL-MCNC: 7 G/DL (ref 6.8–8.8)
RBC # BLD AUTO: 4.03 10E6/UL (ref 3.8–5.2)
WBC # BLD AUTO: 4.7 10E3/UL (ref 4–11)

## 2022-11-10 PROCEDURE — 80076 HEPATIC FUNCTION PANEL: CPT

## 2022-11-10 PROCEDURE — 82565 ASSAY OF CREATININE: CPT

## 2022-11-10 PROCEDURE — 85025 COMPLETE CBC W/AUTO DIFF WBC: CPT

## 2022-11-10 PROCEDURE — 36415 COLL VENOUS BLD VENIPUNCTURE: CPT

## 2022-12-08 ENCOUNTER — LAB (OUTPATIENT)
Dept: LAB | Facility: CLINIC | Age: 82
End: 2022-12-08
Payer: COMMERCIAL

## 2022-12-08 DIAGNOSIS — M05.79 RHEUMATOID ARTHRITIS INVOLVING MULTIPLE SITES WITH POSITIVE RHEUMATOID FACTOR (H): ICD-10-CM

## 2022-12-08 DIAGNOSIS — Z79.899 HIGH RISK MEDICATION USE: ICD-10-CM

## 2022-12-08 LAB
BASOPHILS # BLD AUTO: 0 10E3/UL (ref 0–0.2)
BASOPHILS NFR BLD AUTO: 0 %
EOSINOPHIL # BLD AUTO: 0.1 10E3/UL (ref 0–0.7)
EOSINOPHIL NFR BLD AUTO: 2 %
ERYTHROCYTE [DISTWIDTH] IN BLOOD BY AUTOMATED COUNT: 15.2 % (ref 10–15)
HCT VFR BLD AUTO: 37.4 % (ref 35–47)
HGB BLD-MCNC: 12.2 G/DL (ref 11.7–15.7)
LYMPHOCYTES # BLD AUTO: 1.3 10E3/UL (ref 0.8–5.3)
LYMPHOCYTES NFR BLD AUTO: 21 %
MCH RBC QN AUTO: 29.5 PG (ref 26.5–33)
MCHC RBC AUTO-ENTMCNC: 32.6 G/DL (ref 31.5–36.5)
MCV RBC AUTO: 90 FL (ref 78–100)
MONOCYTES # BLD AUTO: 0.5 10E3/UL (ref 0–1.3)
MONOCYTES NFR BLD AUTO: 9 %
NEUTROPHILS # BLD AUTO: 4.1 10E3/UL (ref 1.6–8.3)
NEUTROPHILS NFR BLD AUTO: 68 %
PLATELET # BLD AUTO: 258 10E3/UL (ref 150–450)
RBC # BLD AUTO: 4.14 10E6/UL (ref 3.8–5.2)
WBC # BLD AUTO: 6.1 10E3/UL (ref 4–11)

## 2022-12-08 PROCEDURE — 36415 COLL VENOUS BLD VENIPUNCTURE: CPT

## 2022-12-08 PROCEDURE — 85025 COMPLETE CBC W/AUTO DIFF WBC: CPT

## 2022-12-08 PROCEDURE — 82565 ASSAY OF CREATININE: CPT

## 2022-12-08 PROCEDURE — 80076 HEPATIC FUNCTION PANEL: CPT

## 2022-12-09 LAB
ALBUMIN SERPL BCG-MCNC: 4.1 G/DL (ref 3.5–5.2)
ALP SERPL-CCNC: 35 U/L (ref 35–104)
ALT SERPL W P-5'-P-CCNC: 31 U/L (ref 10–35)
AST SERPL W P-5'-P-CCNC: 39 U/L (ref 10–35)
BILIRUB DIRECT SERPL-MCNC: <0.2 MG/DL (ref 0–0.3)
BILIRUB SERPL-MCNC: 0.7 MG/DL
CREAT SERPL-MCNC: 0.82 MG/DL (ref 0.51–0.95)
GFR SERPL CREATININE-BSD FRML MDRD: 70 ML/MIN/1.73M2
PROT SERPL-MCNC: 7.1 G/DL (ref 6.4–8.3)

## 2023-01-05 ENCOUNTER — LAB (OUTPATIENT)
Dept: LAB | Facility: CLINIC | Age: 83
End: 2023-01-05
Payer: COMMERCIAL

## 2023-01-05 DIAGNOSIS — Z79.899 HIGH RISK MEDICATION USE: ICD-10-CM

## 2023-01-05 DIAGNOSIS — M05.79 RHEUMATOID ARTHRITIS INVOLVING MULTIPLE SITES WITH POSITIVE RHEUMATOID FACTOR (H): ICD-10-CM

## 2023-01-05 LAB
ALBUMIN SERPL BCG-MCNC: 4.1 G/DL (ref 3.5–5.2)
ALP SERPL-CCNC: 32 U/L (ref 35–104)
ALT SERPL W P-5'-P-CCNC: 20 U/L (ref 10–35)
AST SERPL W P-5'-P-CCNC: 29 U/L (ref 10–35)
BASOPHILS # BLD AUTO: 0 10E3/UL (ref 0–0.2)
BASOPHILS NFR BLD AUTO: 0 %
BILIRUB DIRECT SERPL-MCNC: <0.2 MG/DL (ref 0–0.3)
BILIRUB SERPL-MCNC: 0.4 MG/DL
CREAT SERPL-MCNC: 0.79 MG/DL (ref 0.51–0.95)
CRP SERPL-MCNC: <3 MG/L
EOSINOPHIL # BLD AUTO: 0.1 10E3/UL (ref 0–0.7)
EOSINOPHIL NFR BLD AUTO: 1 %
ERYTHROCYTE [DISTWIDTH] IN BLOOD BY AUTOMATED COUNT: 15.2 % (ref 10–15)
ERYTHROCYTE [SEDIMENTATION RATE] IN BLOOD BY WESTERGREN METHOD: 25 MM/HR (ref 0–30)
GFR SERPL CREATININE-BSD FRML MDRD: 73 ML/MIN/1.73M2
HCT VFR BLD AUTO: 35.6 % (ref 35–47)
HGB BLD-MCNC: 12 G/DL (ref 11.7–15.7)
LYMPHOCYTES # BLD AUTO: 1.3 10E3/UL (ref 0.8–5.3)
LYMPHOCYTES NFR BLD AUTO: 23 %
MCH RBC QN AUTO: 29.6 PG (ref 26.5–33)
MCHC RBC AUTO-ENTMCNC: 33.7 G/DL (ref 31.5–36.5)
MCV RBC AUTO: 88 FL (ref 78–100)
MONOCYTES # BLD AUTO: 0.9 10E3/UL (ref 0–1.3)
MONOCYTES NFR BLD AUTO: 15 %
NEUTROPHILS # BLD AUTO: 3.6 10E3/UL (ref 1.6–8.3)
NEUTROPHILS NFR BLD AUTO: 61 %
PLATELET # BLD AUTO: 224 10E3/UL (ref 150–450)
PROT SERPL-MCNC: 7 G/DL (ref 6.4–8.3)
RBC # BLD AUTO: 4.06 10E6/UL (ref 3.8–5.2)
WBC # BLD AUTO: 5.8 10E3/UL (ref 4–11)

## 2023-01-05 PROCEDURE — 85025 COMPLETE CBC W/AUTO DIFF WBC: CPT

## 2023-01-05 PROCEDURE — 85652 RBC SED RATE AUTOMATED: CPT

## 2023-01-05 PROCEDURE — 36415 COLL VENOUS BLD VENIPUNCTURE: CPT

## 2023-01-05 PROCEDURE — 80076 HEPATIC FUNCTION PANEL: CPT

## 2023-01-05 PROCEDURE — 82565 ASSAY OF CREATININE: CPT

## 2023-01-05 PROCEDURE — 86140 C-REACTIVE PROTEIN: CPT

## 2023-01-12 DIAGNOSIS — M05.79 RHEUMATOID ARTHRITIS INVOLVING MULTIPLE SITES WITH POSITIVE RHEUMATOID FACTOR (H): ICD-10-CM

## 2023-01-12 RX ORDER — FOLIC ACID 1 MG/1
1 TABLET ORAL DAILY
Qty: 90 TABLET | Refills: 2 | Status: SHIPPED | OUTPATIENT
Start: 2023-01-12 | End: 2023-10-16

## 2023-01-12 NOTE — TELEPHONE ENCOUNTER
Pending Prescriptions:                       Disp   Refills    folic acid (FOLVITE) 1 MG tablet          90 tab*2            Sig: Take 1 tablet (1 mg) by mouth daily

## 2023-01-12 NOTE — TELEPHONE ENCOUNTER
"RN refilled medication per [facility] Refill Protocol.     Debi ERICKSON RN    Requested Prescriptions   Signed Prescriptions Disp Refills    folic acid (FOLVITE) 1 MG tablet 90 tablet 2     Sig: Take 1 tablet (1 mg) by mouth daily       Vitamin Supplements (Adult) Protocol Passed - 1/12/2023 10:03 AM        Passed - High dose Vitamin D not ordered        Passed - Recent (12 mo) or future (30 days) visit within the authorizing provider's specialty     Patient has had an office visit with the authorizing provider or a provider within the authorizing providers department within the previous 12 mos or has a future within next 30 days. See \"Patient Info\" tab in inbasket, or \"Choose Columns\" in Meds & Orders section of the refill encounter.              Passed - Medication is active on med list             "

## 2023-02-09 ENCOUNTER — OFFICE VISIT (OUTPATIENT)
Dept: RHEUMATOLOGY | Facility: CLINIC | Age: 83
End: 2023-02-09
Payer: COMMERCIAL

## 2023-02-09 VITALS
HEART RATE: 91 BPM | BODY MASS INDEX: 26.52 KG/M2 | OXYGEN SATURATION: 99 % | WEIGHT: 145 LBS | SYSTOLIC BLOOD PRESSURE: 156 MMHG | DIASTOLIC BLOOD PRESSURE: 77 MMHG

## 2023-02-09 DIAGNOSIS — M05.79 RHEUMATOID ARTHRITIS INVOLVING MULTIPLE SITES WITH POSITIVE RHEUMATOID FACTOR (H): Primary | ICD-10-CM

## 2023-02-09 DIAGNOSIS — Z79.899 HIGH RISK MEDICATION USE: ICD-10-CM

## 2023-02-09 PROCEDURE — 99214 OFFICE O/P EST MOD 30 MIN: CPT | Performed by: INTERNAL MEDICINE

## 2023-02-09 RX ORDER — METHOTREXATE 2.5 MG/1
20 TABLET ORAL WEEKLY
Qty: 96 TABLET | Refills: 2 | Status: SHIPPED | OUTPATIENT
Start: 2023-02-09 | End: 2023-07-13

## 2023-02-09 NOTE — PROGRESS NOTES
Rheumatology Clinic Visit      Nelda Palma MRN# [id number]   YOB: 1940 Age: 82 year old      Date of visit: 2/09/23   PCP: Dr. Dennise Dye at the [facility]  Endocrinologist: Dr. Aditya De Leon at [facility].     Chief Complaint   Patient presents with:  RECHECK: arthritis    Assessment and Plan     1.  Seropositive erosive rheumatoid arthritis (, CCP >2776.8): Reportedly diagnosed in 2004.  Reportedly on hydroxychloroquine in the past that was effective and slowly tapered off.  Note that the patient reports her ophthalmologist telling her that Plaquenil can be very toxic to her eyes and the patient is under the impression that she is not supposed to restart Plaquenil.  Ophthalmology records available for review did not document a contraindication to using Plaquenil.  When she was first evaluated in February 2019, she was found to have erosive disease so methotrexate was started with significant improvement.  In February 2020 she had mild synovitis on exam so the dose of methotrexate was increased with resolution of her symptoms.  Then in October 2022 she had mild synovitis at the MCPs so methotrexate was increased to 20 mg once weekly and she is doing well now.  Inflammatory markers ESR and CRP are normal.  No synovitis on exam today.  Currently on methotrexate 20 mg once weekly.  Chronic illness, progressive.    - Continue methotrexate 20 mg once weekly  - Continue folic acid 1mg daily  - Labs in early April: CBC, Creatinine, Hepatic Panel  - Labs in early July: CBC, Creatinine, Hepatic Panel, ESR, CRP     High risk medication requiring intensive toxicity monitoring at least quarterly: labs ordered include CBC, Creatinine, Hepatic panel to monitor for cytopenia and hepatotoxicity; checking creatinine as it affects clearance of medication.      2. Neck pain in the setting of RA, history: reportedly a chronic issue for years.  No instability or degenerative change seen on  2/14/2019 x-ray.  Significant improvement with physical therapy exercises that she previously was doing at home but she has stopped these exercises and now has neck pain again.  She says that she is undergoing work-up with her primary care provider with plans to return to physical therapy if no other concerning findings are identified.  She will continue following with her primary care provider.    3. Bilateral knee pain: Patellofemoral syndrome.  Significant improvement with physical therapy exercises.      4. Bilateral hand osteoarthritis: Stable    5. Osteoporosis: Managed by her endocrinologist, Dr. Aditya De [facility] at [facility].     6.  Vaccinations: Vaccinations reviewed with Ms. Palma.  - Influenza: Advised yearly vaccination  - [id number]: up to date  - [id number]: up to date  - Shingrix: Up to date   - COVID-19: Up to date    Total minutes spent in evaluation with patient, documentation, , and review of pertinent studies and chart notes: 18     Ms. Palma verbalized agreement with and understanding of the rational for the diagnosis and treatment plan.  All questions were answered to best of my ability and the patient's satisfaction. Ms. Palma was advised to contact the clinic with any questions that may arise after the clinic visit.      Thank you for involving me in the care of the patient    Return to clinic: July HPI   Nelda Palma is a 82 year old female with a past medical history significant for hypertension, left TKA, migraines, rosacea, bilateral cataract surgery in 2009, hypothyroidism, senile osteoporosis, and rheumatoid arthritis who is seen for follow-up of RA.      Today, 2/9/2023: Currently doing well.  No joint pain or swelling.  No morning stiffness or gelling phenomenon.  Feels like her neck has some ache to it and she believes it is due to her posture where she holds her neck forward.  She is undergoing work-up with her primary care provider with plans to restart physical  therapy for her neck pain if no other concerning findings are identified.  Methotrexate is effective for RA management.    Denies fevers, chills, nausea, vomiting, constipation, diarrhea. No abdominal pain. No chest pain/pressure, palpitations, or shortness of breath. No LE swelling. No oral or nasal sores.  No rash. No sicca symptoms.     Tobacco: none  EtOH: none  Drugs: none  Occupation: [employer], now retired    ROS   12 point review of system was completed and negative except as noted in the HPI     Active Problem List     Patient Active Problem List   Diagnosis     Actinic keratosis     Essential hypertension     GA (granuloma annulare)     Headache     Hypothyroidism     Meningioma (H)     Rheumatoid arthritis involving both hands with positive rheumatoid factor (H)     Rosacea     Senile osteoporosis     Sensorineural hearing loss, bilateral     Neck pain     Past Medical History   See above    Past Surgical History   Left TKA    Allergy     Allergies   Allergen Reactions     Oxycodone-Acetaminophen Anxiety     After TKA she developed marked post operative anxiety felt to be due to the Percocet; took it again after going home and symptoms recurred.     Penicillins Rash     Current Medication List     Current Outpatient Medications   Medication Sig     CALCIUM CITRATE-VITAMIN D PO      denosumab (PROLIA) 60 MG/ML SOSY injection Inject 60 mg Subcutaneous     folic acid (FOLVITE) 1 MG tablet Take 1 tablet (1 mg) by mouth daily     hydrochlorothiazide (HYDRODIURIL) 25 MG tablet Take 25 mg by mouth daily     levothyroxine (SYNTHROID/LEVOTHROID) 75 MCG tablet TAKE 1 TABLET (75 MCG) BY MOUTH BEFORE BREAKFAST.     losartan (COZAAR) 50 MG tablet Take 75 mg by mouth daily     magnesium 250 MG tablet Take 1 tablet by mouth daily     methotrexate sodium 2.5 MG TABS Take 8 tablets (20 mg) by mouth once a week . Take all 8 tablets on the same day of each week.     Vitamin D, Cholecalciferol, 400 units  "TABS      diclofenac (VOLTAREN) 1 % topical gel Apply up to 4 times per day to the hands as needed; no more than 2 grams per application to the upper extremities. (Patient not taking: Reported on 2/9/2023)     No current facility-administered medications for this visit.     Social History   See HPI    Family History     Family History   Problem Relation Age of Onset     Diabetes Mother      Diabetes Father        Physical Exam     Temp Readings from Last 3 Encounters:   06/06/19 97.9  F (36.6  C) (Oral)   02/14/19 96.3  F (35.7  C) (Oral)     BP Readings from Last 5 Encounters:   02/09/23 (!) 169/83   10/14/22 (!) 156/89   04/07/22 (!) 143/84   10/07/21 134/79   04/07/21 (!) 179/80     Pulse Readings from Last 1 Encounters:   02/09/23 66     Resp Readings from Last 1 Encounters:   No data found for Resp     Estimated body mass index is 26.52 kg/m  as calculated from the following:    Height as of 10/7/21: 1.575 m (5' 2\").    Weight as of this encounter: 65.8 kg (145 lb).     GEN: NAD.  HEENT:  Anicteric, noninjected sclera. No obvious external lesions of the ear and nose. Hearing intact.  CV: S1, S2.  RRR.  No murmurs or rubs  PULM: No increased work of breathing.  Clear to auscultation bilaterally  MSK:  MCPs are without swelling or tenderness to palpation.  PIPs, DIPs without swelling or tenderness to palpation.  Wrists without swelling or tenderness to palpation.  Elbows and shoulders without swelling or tenderness to palpation.  Knees and ankles without swelling or tenderness to palpation.  MTPs nontender to palpation; minimal padding on the plantar aspect of the MTPs.  SKIN: No rash or jaundice seen  PSYCH: Alert. Appropriate.      Labs / Imaging (select studies)     CBC  Recent Labs   Lab Test 01/05/23  1032 12/08/22  1035 11/10/22  1038 10/04/21  0913 07/08/21  0914 04/07/21  1316 09/25/20  0921   WBC 5.8 6.1 4.7   < > 5.1 7.5 6.6   RBC 4.06 4.14 4.03   < > 4.19 4.18 4.49   HGB 12.0 12.2 11.9   < > 12.1 12.2 " 13.0   HCT 35.6 37.4 35.1   < > 35.9 36.5 39.1   MCV 88 90 87   < > 86 87 87   RDW 15.2* 15.2* 14.5   < > 15.1* 14.9 15.5*    258 266   < > 247 303 268   MCH 29.6 29.5 29.5   < > 28.9 29.2 29.0   MCHC 33.7 32.6 33.9   < > 33.7 33.4 33.2   NEUTROPHIL 61 68 59   < > 63.7 68.2 68.2   LYMPH 23 21 29   < > 23.7 20.6 22.3   MONOCYTE 15 9 11   < > 11.2 9.7 8.1   EOSINOPHIL 1 2 1   < > 1.2 1.2 1.1   BASOPHIL 0 0 1   < > 0.2 0.3 0.3   ANEU  --   --   --   --  3.3 5.2 4.5   ALYM  --   --   --   --  1.2 1.6 1.5   NGOC  --   --   --   --  0.6 0.7 0.5   AEOS  --   --   --   --  0.1 0.1 0.1   ABAS  --   --   --   --  0.0 0.0 0.0   ANEUTAUTO 3.6 4.1 2.8   < >  --   --   --    ALYMPAUTO 1.3 1.3 1.4   < >  --   --   --    AMONOAUTO 0.9 0.5 0.5   < >  --   --   --    AEOSAUTO 0.1 0.1 0.1   < >  --   --   --    ABSBASO 0.0 0.0 0.0   < >  --   --   --     < > = values in this interval not displayed.     CMP  Recent Labs   Lab Test 01/05/23  1032 12/08/22  1035 11/10/22  1038 10/04/21  0913 07/08/21  0914 04/07/21  1316 09/25/20  0921   CR 0.79 0.82 0.75   < > 0.81 0.84 0.86   GFRESTIMATED 73 70 78   < > 67 65 63   GFRESTBLACK  --   --   --   --  78 75 73   BILITOTAL 0.4 0.7 0.5   < > 0.5 0.4 0.5   ALBUMIN 4.1 4.1 3.5   < > 3.6 3.6 3.8   PROTTOTAL 7.0 7.1 7.0   < > 7.1 7.2 7.6   ALKPHOS 32* 35 30*   < > 39* 38* 36*   AST 29 39* 24   < > 22 22 22   ALT 20 31 22   < > 23 26 22    < > = values in this interval not displayed.     ESR/CRP  Recent Labs   Lab Test 01/05/23  1032 10/10/22  1018 04/05/22  1019 01/19/22  1035 10/04/21  0913   SED 25 40* 33* 29 51*   CRP  <3  --  <2.9 <2.9 5.4     Hepatitis B  Recent Labs   Lab Test 09/25/20  0921 10/10/19  1118   HBCAB Nonreactive Nonreactive   HEPBANG Nonreactive  --      Hepatitis C  Recent Labs   Lab Test 07/07/22  1020   HCVAB Nonreactive     Immunization History     Immunization History   Administered Date(s) Administered     COVID-19 Vaccine 12+ (Pfizer 2022) 05/05/2022     COVID-19  Vaccine 18+ (Moderna) 02/25/2021, 03/25/2021, 11/12/2021     COVID-19 Vaccine Bivalent Booster 12+ (Pfizer) 11/18/2022     Influenza Vaccine 65+ (Fluzone HD) 10/14/2022          Chart documentation done in part with Dragon Voice recognition Software. Although reviewed after completion, some word and grammatical error may remain.    Phill Villa MD

## 2023-04-03 ENCOUNTER — LAB (OUTPATIENT)
Dept: LAB | Facility: CLINIC | Age: 83
End: 2023-04-03
Payer: COMMERCIAL

## 2023-04-03 DIAGNOSIS — M05.79 RHEUMATOID ARTHRITIS INVOLVING MULTIPLE SITES WITH POSITIVE RHEUMATOID FACTOR (H): ICD-10-CM

## 2023-04-03 DIAGNOSIS — Z79.899 HIGH RISK MEDICATION USE: ICD-10-CM

## 2023-04-03 LAB
ALBUMIN SERPL BCG-MCNC: 4.3 G/DL (ref 3.5–5.2)
ALP SERPL-CCNC: 33 U/L (ref 35–104)
ALT SERPL W P-5'-P-CCNC: 49 U/L (ref 10–35)
AST SERPL W P-5'-P-CCNC: 48 U/L (ref 10–35)
BASOPHILS # BLD AUTO: 0 10E3/UL (ref 0–0.2)
BASOPHILS NFR BLD AUTO: 0 %
BILIRUB DIRECT SERPL-MCNC: <0.2 MG/DL (ref 0–0.3)
BILIRUB SERPL-MCNC: 0.6 MG/DL
CREAT SERPL-MCNC: 0.81 MG/DL (ref 0.51–0.95)
CRP SERPL-MCNC: <3 MG/L
EOSINOPHIL # BLD AUTO: 0.1 10E3/UL (ref 0–0.7)
EOSINOPHIL NFR BLD AUTO: 2 %
ERYTHROCYTE [DISTWIDTH] IN BLOOD BY AUTOMATED COUNT: 15.1 % (ref 10–15)
ERYTHROCYTE [SEDIMENTATION RATE] IN BLOOD BY WESTERGREN METHOD: 22 MM/HR (ref 0–30)
GFR SERPL CREATININE-BSD FRML MDRD: 71 ML/MIN/1.73M2
HCT VFR BLD AUTO: 37 % (ref 35–47)
HGB BLD-MCNC: 12.5 G/DL (ref 11.7–15.7)
LYMPHOCYTES # BLD AUTO: 1.3 10E3/UL (ref 0.8–5.3)
LYMPHOCYTES NFR BLD AUTO: 28 %
MCH RBC QN AUTO: 29.7 PG (ref 26.5–33)
MCHC RBC AUTO-ENTMCNC: 33.8 G/DL (ref 31.5–36.5)
MCV RBC AUTO: 88 FL (ref 78–100)
MONOCYTES # BLD AUTO: 0.7 10E3/UL (ref 0–1.3)
MONOCYTES NFR BLD AUTO: 15 %
NEUTROPHILS # BLD AUTO: 2.6 10E3/UL (ref 1.6–8.3)
NEUTROPHILS NFR BLD AUTO: 55 %
PLATELET # BLD AUTO: 226 10E3/UL (ref 150–450)
PROT SERPL-MCNC: 7.3 G/DL (ref 6.4–8.3)
RBC # BLD AUTO: 4.21 10E6/UL (ref 3.8–5.2)
WBC # BLD AUTO: 4.7 10E3/UL (ref 4–11)

## 2023-04-03 PROCEDURE — 82565 ASSAY OF CREATININE: CPT

## 2023-04-03 PROCEDURE — 85025 COMPLETE CBC W/AUTO DIFF WBC: CPT

## 2023-04-03 PROCEDURE — 80076 HEPATIC FUNCTION PANEL: CPT

## 2023-04-03 PROCEDURE — 86140 C-REACTIVE PROTEIN: CPT

## 2023-04-03 PROCEDURE — 36415 COLL VENOUS BLD VENIPUNCTURE: CPT

## 2023-04-03 PROCEDURE — 85652 RBC SED RATE AUTOMATED: CPT

## 2023-07-10 ENCOUNTER — LAB (OUTPATIENT)
Dept: LAB | Facility: CLINIC | Age: 83
End: 2023-07-10
Payer: COMMERCIAL

## 2023-07-10 DIAGNOSIS — Z79.899 HIGH RISK MEDICATION USE: ICD-10-CM

## 2023-07-10 DIAGNOSIS — M05.79 RHEUMATOID ARTHRITIS INVOLVING MULTIPLE SITES WITH POSITIVE RHEUMATOID FACTOR (H): ICD-10-CM

## 2023-07-10 LAB
ALBUMIN SERPL BCG-MCNC: 4.1 G/DL (ref 3.5–5.2)
ALP SERPL-CCNC: 30 U/L (ref 35–104)
ALT SERPL W P-5'-P-CCNC: 93 U/L (ref 0–50)
AST SERPL W P-5'-P-CCNC: 75 U/L (ref 0–45)
BASOPHILS # BLD AUTO: 0 10E3/UL (ref 0–0.2)
BASOPHILS NFR BLD AUTO: 0 %
BILIRUB DIRECT SERPL-MCNC: <0.2 MG/DL (ref 0–0.3)
BILIRUB SERPL-MCNC: 0.3 MG/DL
CREAT SERPL-MCNC: 0.79 MG/DL (ref 0.51–0.95)
EOSINOPHIL # BLD AUTO: 0.1 10E3/UL (ref 0–0.7)
EOSINOPHIL NFR BLD AUTO: 1 %
ERYTHROCYTE [DISTWIDTH] IN BLOOD BY AUTOMATED COUNT: 14.6 % (ref 10–15)
GFR SERPL CREATININE-BSD FRML MDRD: 73 ML/MIN/1.73M2
HCT VFR BLD AUTO: 34 % (ref 35–47)
HGB BLD-MCNC: 11.4 G/DL (ref 11.7–15.7)
IMM GRANULOCYTES # BLD: 0 10E3/UL
IMM GRANULOCYTES NFR BLD: 0 %
LYMPHOCYTES # BLD AUTO: 1.3 10E3/UL (ref 0.8–5.3)
LYMPHOCYTES NFR BLD AUTO: 22 %
MCH RBC QN AUTO: 29.8 PG (ref 26.5–33)
MCHC RBC AUTO-ENTMCNC: 33.5 G/DL (ref 31.5–36.5)
MCV RBC AUTO: 89 FL (ref 78–100)
MONOCYTES # BLD AUTO: 0.7 10E3/UL (ref 0–1.3)
MONOCYTES NFR BLD AUTO: 11 %
NEUTROPHILS # BLD AUTO: 4 10E3/UL (ref 1.6–8.3)
NEUTROPHILS NFR BLD AUTO: 66 %
PLATELET # BLD AUTO: 212 10E3/UL (ref 150–450)
PROT SERPL-MCNC: 6.8 G/DL (ref 6.4–8.3)
RBC # BLD AUTO: 3.82 10E6/UL (ref 3.8–5.2)
WBC # BLD AUTO: 6 10E3/UL (ref 4–11)

## 2023-07-10 PROCEDURE — 82565 ASSAY OF CREATININE: CPT

## 2023-07-10 PROCEDURE — 36415 COLL VENOUS BLD VENIPUNCTURE: CPT

## 2023-07-10 PROCEDURE — 80076 HEPATIC FUNCTION PANEL: CPT

## 2023-07-10 PROCEDURE — 85025 COMPLETE CBC W/AUTO DIFF WBC: CPT

## 2023-07-13 ENCOUNTER — OFFICE VISIT (OUTPATIENT)
Dept: RHEUMATOLOGY | Facility: CLINIC | Age: 83
End: 2023-07-13
Payer: COMMERCIAL

## 2023-07-13 VITALS
OXYGEN SATURATION: 98 % | DIASTOLIC BLOOD PRESSURE: 81 MMHG | SYSTOLIC BLOOD PRESSURE: 143 MMHG | BODY MASS INDEX: 26.41 KG/M2 | HEART RATE: 67 BPM | WEIGHT: 144.4 LBS

## 2023-07-13 DIAGNOSIS — Z79.899 HIGH RISK MEDICATION USE: ICD-10-CM

## 2023-07-13 DIAGNOSIS — M05.79 RHEUMATOID ARTHRITIS INVOLVING MULTIPLE SITES WITH POSITIVE RHEUMATOID FACTOR (H): Primary | ICD-10-CM

## 2023-07-13 PROCEDURE — 99214 OFFICE O/P EST MOD 30 MIN: CPT | Performed by: INTERNAL MEDICINE

## 2023-07-13 RX ORDER — METHOTREXATE 2.5 MG/1
15 TABLET ORAL WEEKLY
Qty: 72 TABLET | Refills: 0 | Status: SHIPPED | OUTPATIENT
Start: 2023-07-13 | End: 2023-10-23

## 2023-07-13 NOTE — NURSING NOTE
RAPID3 (0-30) Cumulative Score  0.8          RAPID3 Weighted Score (divide #4 by 3 and that is the weighted score)  0.2

## 2023-07-13 NOTE — PATIENT INSTRUCTIONS
RHEUMATOLOGY    [facility]  [address]  [place] [address]    Phone number: [phone]  Fax number: [phone]    If you need a medication refill, please contact us as you may need lab work and/or a follow up visit prior to your refill.      Thank you for choosing [facility]!    Abi Sauceda CMA Rheumatology    -----------------------------    Stop methotrexate for 2 weeks, then restart at the lower dose of 15 mg (6 tablets) once weekly    Labs monthly for the next 3 months

## 2023-07-13 NOTE — PROGRESS NOTES
Rheumatology Clinic Visit      Nelda Palma MRN# [id number]   YOB: 1940 Age: 82 year old      Date of visit: 7/13/23   PCP: Dr. Dennise Dye at the [facility]  Endocrinologist: Dr. Aditya De Leon at [facility].     Chief Complaint   Patient presents with:  Rheumatoid Arthritis    Assessment and Plan     1.  Seropositive erosive rheumatoid arthritis (, CCP >2776.8): Reportedly diagnosed in 2004.  Reportedly on hydroxychloroquine in the past that was effective and slowly tapered off.  Note that the patient reports her ophthalmologist telling her that Plaquenil can be very toxic to her eyes and the patient is under the impression that she is not supposed to restart Plaquenil.  Ophthalmology records available for review did not document a contraindication to using Plaquenil.  When she was first evaluated in February 2019, she was found to have erosive disease so methotrexate was started with significant improvement.  In February 2020 she had mild synovitis on exam so the dose of methotrexate was increased with resolution of her symptoms.  Then in October 2022 she had mild synovitis at the [facility] so methotrexate was increased to 20 mg once weekly and she improved.  No synovitis on exam today.  However, now with LFT elevation.  Hold methotrexate for 2 weeks and restart at the lower dose of 15 mg once weekly.   Recheck hepatic panel monthly for the next 2 months and follow-up in 3 months.  Chronic illness, side effect from treatment  - Stop methotrexate for 2 weeks, then restart at the lower dose of 15 mg once weekly  - Continue folic acid 1mg daily  - Labs monthly [id number]:  Hepatic Panel  - Labs in 3 months: CBC, Creatinine, Hepatic Panel, ESR, CRP    High risk medication requiring intensive toxicity monitoring at least quarterly     2.  Nonradiating neck pain in the setting of RA: reportedly a chronic issue for years.  No instability or degenerative change seen on 2/14/2019  x-ray.  Significant improvement with physical therapy exercises that she previously was doing at home but she has stopped these exercises and now has neck pain again.  She is working with her primary care for this issue.    3. Bilateral knee pain: Patellofemoral syndrome.  Significant improvement with physical therapy exercises.      4. Bilateral hand osteoarthritis: Stable    5. Osteoporosis: Managed by her endocrinologist, Dr. Aditya De Leon at [facility].     6.  Vaccinations: Vaccinations reviewed with Ms. Palma.  - Influenza: Advised yearly vaccination  - [id number]: up to date  - [id number]: up to date  - Shingrix: Up to date   - COVID-19: Up to date    7. Elevated blood pressure:  Nelda to follow up with Primary Care provider regarding elevated blood pressure.     Total minutes spent in evaluation with patient, documentation, , and review of pertinent studies and chart notes: 20     Ms. Palma verbalized agreement with and understanding of the rational for the diagnosis and treatment plan.  All questions were answered to best of my ability and the patient's satisfaction. Ms. Palma was advised to contact the clinic with any questions that may arise after the clinic visit.      Thank you for involving me in the care of the patient    Return to clinic: 3 months      HPI   Nelda Palma is a 82 year old female with a past medical history significant for hypertension, left TKA, migraines, rosacea, bilateral cataract surgery in 2009, hypothyroidism, senile osteoporosis, and rheumatoid arthritis who is seen for follow-up of RA.      Today, 7/13/2023: Return of neck pain since she has not been doing physical therapy exercises on her own at home.  She plans to restart physical therapy exercises for her neck.  She has been following with her primary care provider for chronic neck pain.  Rheumatoid arthritis well controlled.  States that methotrexate sometimes causes mild stomach upset so she was thinking about  splitting the dose up to be 4 tablets in the morning and 4 tablets in the evening on the same day of each week.  Morning stiffness for no more than 5-10 minutes.  Sedentary lifestyle; sometimes walks for exercise.    Denies fevers, chills, nausea, vomiting, constipation, diarrhea. No abdominal pain. No chest pain/pressure, palpitations, or shortness of breath. No LE swelling. No oral or nasal sores.  No rash. No sicca symptoms.     Tobacco: none  EtOH: none  Drugs: none  Occupation: [employer], now retired    ROS   12 point review of system was completed and negative except as noted in the HPI     Active Problem List     Patient Active Problem List   Diagnosis     Actinic keratosis     Essential hypertension     GA (granuloma annulare)     Headache     Hypothyroidism     Meningioma (H)     Rheumatoid arthritis involving both hands with positive rheumatoid factor (H)     Rosacea     Senile osteoporosis     Sensorineural hearing loss, bilateral     Neck pain     Past Medical History   See above    Past Surgical History   Left TKA    Allergy     Allergies   Allergen Reactions     Oxycodone-Acetaminophen Anxiety     After TKA she developed marked post operative anxiety felt to be due to the Percocet; took it again after going home and symptoms recurred.     Penicillins Rash     Current Medication List     Current Outpatient Medications   Medication Sig     CALCIUM CITRATE-VITAMIN D PO      folic acid (FOLVITE) 1 MG tablet Take 1 tablet (1 mg) by mouth daily     hydrochlorothiazide (HYDRODIURIL) 25 MG tablet Take 25 mg by mouth daily     levothyroxine (SYNTHROID/LEVOTHROID) 75 MCG tablet TAKE 1 TABLET (75 MCG) BY MOUTH BEFORE BREAKFAST.     losartan (COZAAR) 50 MG tablet Take 75 mg by mouth daily     magnesium 250 MG tablet Take 1 tablet by mouth daily     methotrexate sodium 2.5 MG TABS Take 8 tablets (20 mg) by mouth once a week . Take all 8 tablets on the same day of each week.     Vitamin D,  "Cholecalciferol, 400 units TABS      denosumab (PROLIA) 60 MG/ML SOSY injection Inject 60 mg Subcutaneous     diclofenac (VOLTAREN) 1 % topical gel Apply up to 4 times per day to the hands as needed; no more than 2 grams per application to the upper extremities. (Patient not taking: Reported on 2/9/2023)     No current facility-administered medications for this visit.     Social History   See HPI    Family History     Family History   Problem Relation Age of Onset     Diabetes Mother      Diabetes Father        Physical Exam     Temp Readings from Last 3 Encounters:   06/06/19 97.9  F (36.6  C) (Oral)   02/14/19 96.3  F (35.7  C) (Oral)     BP Readings from Last 5 Encounters:   02/09/23 (!) 156/77   10/14/22 (!) 156/89   04/07/22 (!) 143/84   10/07/21 134/79   04/07/21 (!) 179/80     Pulse Readings from Last 1 Encounters:   02/09/23 91     Resp Readings from Last 1 Encounters:   No data found for Resp     Estimated body mass index is 26.52 kg/m  as calculated from the following:    Height as of 10/7/21: 1.575 m (5' 2\").    Weight as of 2/9/23: 65.8 kg (145 lb).       GEN: NAD. Healthy appearing adult.   HEENT:  Anicteric, noninjected sclera. No obvious external lesions of the ear and nose. Hearing intact.  CV: S1, S2. RRR. No m/r/g  PULM: No increased work of breathing. CTA bilaterally   MSK: MCPs, PIPs, DIPs without swelling or tenderness to palpation.  Wrists without swelling or tenderness to palpation.  Elbows and shoulders without swelling or tenderness to palpation.   Knees, ankles, and MTPs without swelling or tenderness to palpation.    SKIN: No rash or jaundice seen  PSYCH: Alert. Appropriate.         Labs / Imaging (select studies)       CBC  Recent Labs   Lab Test 07/10/23  1020 04/03/23  1028 01/05/23  1032 10/04/21  0913 07/08/21  0914 04/07/21  1316 09/25/20  0921   WBC 6.0 4.7 5.8   < > 5.1 7.5 6.6   RBC 3.82 4.21 4.06   < > 4.19 4.18 4.49   HGB 11.4* 12.5 12.0   < > 12.1 12.2 13.0   HCT 34.0* 37.0 " 35.6   < > 35.9 36.5 39.1   MCV 89 88 88   < > 86 87 87   RDW 14.6 15.1* 15.2*   < > 15.1* 14.9 15.5*    226 224   < > 247 303 268   MCH 29.8 29.7 29.6   < > 28.9 29.2 29.0   MCHC 33.5 33.8 33.7   < > 33.7 33.4 33.2   NEUTROPHIL 66 55 61   < > 63.7 68.2 68.2   LYMPH 22 28 23   < > 23.7 20.6 22.3   MONOCYTE 11 15 15   < > 11.2 9.7 8.1   EOSINOPHIL 1 2 1   < > 1.2 1.2 1.1   BASOPHIL 0 0 0   < > 0.2 0.3 0.3   ANEU  --   --   --   --  3.3 5.2 4.5   ALYM  --   --   --   --  1.2 1.6 1.5   NGOC  --   --   --   --  0.6 0.7 0.5   AEOS  --   --   --   --  0.1 0.1 0.1   ABAS  --   --   --   --  0.0 0.0 0.0   ANEUTAUTO 4.0 2.6 3.6   < >  --   --   --    ALYMPAUTO 1.3 1.3 1.3   < >  --   --   --    AMONOAUTO 0.7 0.7 0.9   < >  --   --   --    AEOSAUTO 0.1 0.1 0.1   < >  --   --   --    ABSBASO 0.0 0.0 0.0   < >  --   --   --     < > = values in this interval not displayed.     CMP  Recent Labs   Lab Test 07/10/23  1024 04/03/23  1028 01/05/23  1032 10/04/21  0913 07/08/21  0914 04/07/21  1316 09/25/20  0921   CR 0.79 0.81 0.79   < > 0.81 0.84 0.86   GFRESTIMATED 73 71 73   < > 67 65 63   GFRESTBLACK  --   --   --   --  78 75 73   BILITOTAL 0.3 0.6 0.4   < > 0.5 0.4 0.5   ALBUMIN 4.1 4.3 4.1   < > 3.6 3.6 3.8   PROTTOTAL 6.8 7.3 7.0   < > 7.1 7.2 7.6   ALKPHOS 30* 33* 32*   < > 39* 38* 36*   AST 75* 48* 29   < > 22 22 22   ALT 93* 49* 20   < > 23 26 22    < > = values in this interval not displayed.     ESR/CRP  Recent Labs   Lab Test 04/03/23  1028 01/05/23  1032 10/10/22  1018 04/05/22  1019 01/19/22  1035 10/04/21  0913   SED 22 25 40* 33* 29 51*   CRP  --   --   --  <2.9 <2.9 5.4   CRPI <3.00 <3.00 <3.00  --   --   --      Hepatitis B  Recent Labs   Lab Test 09/25/20  0921 10/10/19  1118   HBCAB Nonreactive Nonreactive   HEPBANG Nonreactive  --      Hepatitis C  Recent Labs   Lab Test 07/07/22  1020   HCVAB Nonreactive     Immunization History     Immunization History   Administered Date(s) Administered     COVID-19  Bivalent 12+ (Pfizer) 11/18/2022, 05/08/2023     COVID-19 Monovalent 12+ (Pfizer 2022) 05/05/2022     COVID-19 Monovalent 18+ (Moderna) 02/25/2021, 03/25/2021, 11/12/2021     Influenza Vaccine 65+ (Fluzone HD) 10/14/2022          Chart documentation done in part with Dragon Voice recognition Software. Although reviewed after completion, some word and grammatical error may remain.    Phill Villa MD

## 2023-08-10 ENCOUNTER — LAB (OUTPATIENT)
Dept: LAB | Facility: CLINIC | Age: 83
End: 2023-08-10
Payer: COMMERCIAL

## 2023-08-10 DIAGNOSIS — M05.79 RHEUMATOID ARTHRITIS INVOLVING MULTIPLE SITES WITH POSITIVE RHEUMATOID FACTOR (H): ICD-10-CM

## 2023-08-10 DIAGNOSIS — Z79.899 HIGH RISK MEDICATION USE: ICD-10-CM

## 2023-08-10 LAB
ALBUMIN SERPL BCG-MCNC: 4.2 G/DL (ref 3.5–5.2)
ALP SERPL-CCNC: 33 U/L (ref 35–104)
ALT SERPL W P-5'-P-CCNC: 34 U/L (ref 0–50)
AST SERPL W P-5'-P-CCNC: 45 U/L (ref 0–45)
BILIRUB DIRECT SERPL-MCNC: <0.2 MG/DL (ref 0–0.3)
BILIRUB SERPL-MCNC: 0.5 MG/DL
PROT SERPL-MCNC: 7.2 G/DL (ref 6.4–8.3)

## 2023-08-10 PROCEDURE — 36415 COLL VENOUS BLD VENIPUNCTURE: CPT

## 2023-08-10 PROCEDURE — 80076 HEPATIC FUNCTION PANEL: CPT

## 2023-09-07 ENCOUNTER — LAB (OUTPATIENT)
Dept: LAB | Facility: CLINIC | Age: 83
End: 2023-09-07
Payer: COMMERCIAL

## 2023-09-07 DIAGNOSIS — Z79.899 HIGH RISK MEDICATION USE: ICD-10-CM

## 2023-09-07 DIAGNOSIS — M05.79 RHEUMATOID ARTHRITIS INVOLVING MULTIPLE SITES WITH POSITIVE RHEUMATOID FACTOR (H): ICD-10-CM

## 2023-09-07 PROCEDURE — 80076 HEPATIC FUNCTION PANEL: CPT

## 2023-09-07 PROCEDURE — 36415 COLL VENOUS BLD VENIPUNCTURE: CPT

## 2023-09-08 LAB
ALBUMIN SERPL BCG-MCNC: 4.2 G/DL (ref 3.5–5.2)
ALP SERPL-CCNC: 37 U/L (ref 35–104)
ALT SERPL W P-5'-P-CCNC: 28 U/L (ref 0–50)
AST SERPL W P-5'-P-CCNC: 42 U/L (ref 0–45)
BILIRUB DIRECT SERPL-MCNC: <0.2 MG/DL (ref 0–0.3)
BILIRUB SERPL-MCNC: 0.5 MG/DL
PROT SERPL-MCNC: 7.4 G/DL (ref 6.4–8.3)

## 2023-10-16 DIAGNOSIS — M05.79 RHEUMATOID ARTHRITIS INVOLVING MULTIPLE SITES WITH POSITIVE RHEUMATOID FACTOR (H): ICD-10-CM

## 2023-10-16 NOTE — TELEPHONE ENCOUNTER
Medication:   Folic acid 1 mg  Last written on:   01/12/2023  Quantity:   90    Refills:   2    Last office visit:   07/13/2023  Next office visit:   10/23/2023  Last labs:   07/10/2023

## 2023-10-18 RX ORDER — FOLIC ACID 1 MG/1
1 TABLET ORAL DAILY
Qty: 90 TABLET | Refills: 2 | Status: SHIPPED | OUTPATIENT
Start: 2023-10-18 | End: 2024-04-22

## 2023-10-19 ENCOUNTER — LAB (OUTPATIENT)
Dept: LAB | Facility: CLINIC | Age: 83
End: 2023-10-19
Payer: COMMERCIAL

## 2023-10-19 DIAGNOSIS — M05.79 RHEUMATOID ARTHRITIS INVOLVING MULTIPLE SITES WITH POSITIVE RHEUMATOID FACTOR (H): ICD-10-CM

## 2023-10-19 DIAGNOSIS — Z79.899 HIGH RISK MEDICATION USE: ICD-10-CM

## 2023-10-19 LAB
ALBUMIN SERPL BCG-MCNC: 4.2 G/DL (ref 3.5–5.2)
ALP SERPL-CCNC: 35 U/L (ref 35–104)
ALT SERPL W P-5'-P-CCNC: 36 U/L (ref 0–50)
AST SERPL W P-5'-P-CCNC: 44 U/L (ref 0–45)
BASO+EOS+MONOS # BLD AUTO: NORMAL 10*3/UL
BASO+EOS+MONOS NFR BLD AUTO: NORMAL %
BASOPHILS # BLD AUTO: 0 10E3/UL (ref 0–0.2)
BASOPHILS NFR BLD AUTO: 1 %
BILIRUB DIRECT SERPL-MCNC: <0.2 MG/DL (ref 0–0.3)
BILIRUB SERPL-MCNC: 0.5 MG/DL
CREAT SERPL-MCNC: 0.69 MG/DL (ref 0.51–0.95)
CRP SERPL-MCNC: <3 MG/L
EGFRCR SERPLBLD CKD-EPI 2021: 85 ML/MIN/1.73M2
EOSINOPHIL # BLD AUTO: 0.1 10E3/UL (ref 0–0.7)
EOSINOPHIL NFR BLD AUTO: 1 %
ERYTHROCYTE [DISTWIDTH] IN BLOOD BY AUTOMATED COUNT: 14.3 % (ref 10–15)
ERYTHROCYTE [SEDIMENTATION RATE] IN BLOOD BY WESTERGREN METHOD: 25 MM/HR (ref 0–30)
HCT VFR BLD AUTO: 38.9 % (ref 35–47)
HGB BLD-MCNC: 12.8 G/DL (ref 11.7–15.7)
IMM GRANULOCYTES # BLD: 0 10E3/UL
IMM GRANULOCYTES NFR BLD: 0 %
LYMPHOCYTES # BLD AUTO: 1.2 10E3/UL (ref 0.8–5.3)
LYMPHOCYTES NFR BLD AUTO: 27 %
MCH RBC QN AUTO: 29.3 PG (ref 26.5–33)
MCHC RBC AUTO-ENTMCNC: 32.9 G/DL (ref 31.5–36.5)
MCV RBC AUTO: 89 FL (ref 78–100)
MONOCYTES # BLD AUTO: 0.5 10E3/UL (ref 0–1.3)
MONOCYTES NFR BLD AUTO: 12 %
NEUTROPHILS # BLD AUTO: 2.6 10E3/UL (ref 1.6–8.3)
NEUTROPHILS NFR BLD AUTO: 59 %
PLATELET # BLD AUTO: 223 10E3/UL (ref 150–450)
PROT SERPL-MCNC: 7.3 G/DL (ref 6.4–8.3)
RBC # BLD AUTO: 4.37 10E6/UL (ref 3.8–5.2)
WBC # BLD AUTO: 4.5 10E3/UL (ref 4–11)

## 2023-10-19 PROCEDURE — 80076 HEPATIC FUNCTION PANEL: CPT

## 2023-10-19 PROCEDURE — 36415 COLL VENOUS BLD VENIPUNCTURE: CPT

## 2023-10-19 PROCEDURE — 82565 ASSAY OF CREATININE: CPT

## 2023-10-19 PROCEDURE — 85025 COMPLETE CBC W/AUTO DIFF WBC: CPT

## 2023-10-19 PROCEDURE — 85652 RBC SED RATE AUTOMATED: CPT

## 2023-10-19 PROCEDURE — 86140 C-REACTIVE PROTEIN: CPT

## 2023-10-23 ENCOUNTER — OFFICE VISIT (OUTPATIENT)
Dept: RHEUMATOLOGY | Facility: CLINIC | Age: 83
End: 2023-10-23
Payer: COMMERCIAL

## 2023-10-23 VITALS
HEART RATE: 71 BPM | BODY MASS INDEX: 26.89 KG/M2 | WEIGHT: 147 LBS | DIASTOLIC BLOOD PRESSURE: 74 MMHG | SYSTOLIC BLOOD PRESSURE: 138 MMHG | OXYGEN SATURATION: 98 %

## 2023-10-23 DIAGNOSIS — G89.29 CHRONIC PAIN OF BOTH KNEES: ICD-10-CM

## 2023-10-23 DIAGNOSIS — Z79.899 HIGH RISK MEDICATION USE: ICD-10-CM

## 2023-10-23 DIAGNOSIS — M25.562 CHRONIC PAIN OF BOTH KNEES: ICD-10-CM

## 2023-10-23 DIAGNOSIS — M05.79 RHEUMATOID ARTHRITIS INVOLVING MULTIPLE SITES WITH POSITIVE RHEUMATOID FACTOR (H): Primary | ICD-10-CM

## 2023-10-23 DIAGNOSIS — Z96.652 HX OF TOTAL KNEE ARTHROPLASTY, LEFT: ICD-10-CM

## 2023-10-23 DIAGNOSIS — Z23 NEED FOR PROPHYLACTIC VACCINATION AND INOCULATION AGAINST INFLUENZA: ICD-10-CM

## 2023-10-23 DIAGNOSIS — M25.561 CHRONIC PAIN OF BOTH KNEES: ICD-10-CM

## 2023-10-23 PROCEDURE — 99214 OFFICE O/P EST MOD 30 MIN: CPT | Mod: 25 | Performed by: INTERNAL MEDICINE

## 2023-10-23 PROCEDURE — G0008 ADMIN INFLUENZA VIRUS VAC: HCPCS | Performed by: INTERNAL MEDICINE

## 2023-10-23 PROCEDURE — 90662 IIV NO PRSV INCREASED AG IM: CPT | Performed by: INTERNAL MEDICINE

## 2023-10-23 RX ORDER — METHOTREXATE 2.5 MG/1
15 TABLET ORAL WEEKLY
Qty: 78 TABLET | Refills: 0 | Status: SHIPPED | OUTPATIENT
Start: 2023-10-23 | End: 2024-02-21

## 2023-10-23 NOTE — PATIENT INSTRUCTIONS
RHEUMATOLOGY    [facility]  [address] [place] [address]  [place] [address]    Phone number: [phone]  Fax number: [phone]    If you need a medication refill, please contact us as you may need lab work and/or a follow up visit prior to your refill.      Thank you for choosing [facility]!    Abi Sauceda CMA Rheumatology

## 2023-10-23 NOTE — NURSING NOTE
Blood pressure rechecked after visit    138/74  Abi Sauceda CMA Rheumatology  10/23/2023                                RAPID3 (0-30) Cumulative Score  0.8          RAPID3 Weighted Score (divide #4 by 3 and that is the weighted score)  0.2

## 2023-10-23 NOTE — PROGRESS NOTES
Rheumatology Clinic Visit      Nelda Palma MRN# [id number]   YOB: 1940 Age: 83 year old      Date of visit: 10/23/23   PCP: Dr. Dennise Dye at the [facility]  Endocrinologist: Dr. Aditya De Leon at [facility].     Chief Complaint   Patient presents with:  Rheumatoid Arthritis  Flu Shot  Imm/Inj: Flu Shot    Assessment and Plan     1.  Seropositive erosive rheumatoid arthritis (, CCP >2776.8): Reportedly diagnosed in 2004.  Reportedly on hydroxychloroquine in the past that was effective and slowly tapered off.  Note that the patient reports her ophthalmologist telling her that Plaquenil can be very toxic to her eyes and the patient is under the impression from her ophthalmologist that she is to avoid Plaquenil, but I do not see records documenting a contraindication for Plaquenil use so if it is needed again in the future then we will need ophthalmology clearance.  Currently on methotrexate 15 mg once weekly (transaminitis with higher doses).  Doing well with regard to RA.   Chronic illness, stable  - Continue methotrexate 15 mg once weekly  - Continue folic acid 1mg daily  - Labs in 3 months: CBC, Creatinine, Hepatic Panel  - Labs in 6 months: CBC, Creatinine, Hepatic Panel, ESR, CRP     High risk medication requiring intensive toxicity monitoring at least quarterly     2.  Nonradiating neck pain in the setting of RA: reportedly a chronic issue for years.  No instability or degenerative change seen on 2/14/2019 x-ray.  Significant improvement with physical therapy exercises and does well as long as she continues these exercises on her own at home.    3. Bilateral knee pain: Patellofemoral syndrome clinically; history of left TKA.  Significant improvement with physical therapy exercises in the past but having knee pain again.  Has not been doing the exercises.  Discussed referral back to physical therapy and she is in agreement.  Discussed the option for x-rays but she  prefers to start with just physical therapy and if not improving then consider x-rays.  If left knee pain worsens then she may need to see orthopedics.  Chronic illness, progressive.    - Physical therapy referral    4. Bilateral hand osteoarthritis: Stable    5. Osteoporosis: Managed by her endocrinologist, Dr. Aditya De Leon at [facility].     6.  Vaccinations: Vaccinations reviewed with Ms. Palma.  - Influenza: Advised yearly vaccination, and to hold methotrexate for 1 week afterward  - Shingrix: Up to date   - COVID-19: Advised keeping updated, and to hold methotrexate for 1-2 weeks afterward  - Tetanus: Advised updating       Total minutes spent in evaluation with patient, documentation, , and review of pertinent studies and chart notes: 20     Ms. Palma verbalized agreement with and understanding of the rational for the diagnosis and treatment plan.  All questions were answered to best of my ability and the patient's satisfaction. Ms. Palma was advised to contact the clinic with any questions that may arise after the clinic visit.      Thank you for involving me in the care of the patient    Return to clinic: 3 months      HPI   Nelda Palma is a 83 year old female with a past medical history significant for hypertension, left TKA (2007), migraines, rosacea, bilateral cataract surgery in 2009, hypothyroidism, senile osteoporosis, and rheumatoid arthritis who is seen for follow-up of RA.      7/13/2023: Return of neck pain since she has not been doing physical therapy exercises on her own at home.  She plans to restart physical therapy exercises for her neck.  She has been following with her primary care provider for chronic neck pain.  Rheumatoid arthritis well controlled.  States that methotrexate sometimes causes mild stomach upset so she was thinking about splitting the dose up to be 4 tablets in the morning and 4 tablets in the evening on the same day of each week.  Morning stiffness for no more  than 5-10 minutes.  Sedentary lifestyle; sometimes walks for exercise.    Today, 10/23/2023: Bilateral knee pain since she has reduced her physical activity.  She says she is not walking much because she is lazy.  Knee pain improved with physical therapy in the past but she is not doing those exercises now.  No other joint pain.  Morning stiffness for no more than 5-10 minutes.  Taking methotrexate 15 mg once weekly.  No neck pain currently.    Denies fevers, chills, nausea, vomiting, constipation, diarrhea. No abdominal pain. No chest pain/pressure, palpitations, or shortness of breath. No LE swelling. No oral or nasal sores.  No rash. No sicca symptoms.     Tobacco: none  EtOH: none  Drugs: none  Occupation: [employer], now retired    ROS   12 point review of system was completed and negative except as noted in the HPI     Active Problem List     Patient Active Problem List   Diagnosis    Actinic keratosis    Essential hypertension    GA (granuloma annulare)    Headache    Hypothyroidism    Meningioma (H)    Rheumatoid arthritis involving both hands with positive rheumatoid factor (H)    Rosacea    Senile osteoporosis    Sensorineural hearing loss, bilateral    Neck pain     Past Medical History   See above    Past Surgical History   Left TKA    Allergy     Allergies   Allergen Reactions    Oxycodone-Acetaminophen Anxiety     After TKA she developed marked post operative anxiety felt to be due to the Percocet; took it again after going home and symptoms recurred.    Penicillins Rash     Current Medication List     Current Outpatient Medications   Medication Sig    CALCIUM CITRATE-VITAMIN D PO     folic acid (FOLVITE) 1 MG tablet Take 1 tablet (1 mg) by mouth daily    hydrochlorothiazide (HYDRODIURIL) 25 MG tablet Take 25 mg by mouth daily    levothyroxine (SYNTHROID/LEVOTHROID) 75 MCG tablet TAKE 1 TABLET (75 MCG) BY MOUTH BEFORE BREAKFAST.    losartan (COZAAR) 50 MG tablet Take 75 mg by mouth  "daily    magnesium 250 MG tablet Take 1 tablet by mouth daily    methotrexate sodium 2.5 MG TABS Take 6 tablets (15 mg) by mouth once a week . This is a once weekly medication, taken on the same day of each week.    Vitamin D, Cholecalciferol, 400 units TABS     denosumab (PROLIA) 60 MG/ML SOSY injection Inject 60 mg Subcutaneous    diclofenac (VOLTAREN) 1 % topical gel Apply up to 4 times per day to the hands as needed; no more than 2 grams per application to the upper extremities. (Patient not taking: Reported on 2/9/2023)     No current facility-administered medications for this visit.     Social History   See HPI    Family History     Family History   Problem Relation Age of Onset    Diabetes Mother     Diabetes Father        Physical Exam     Temp Readings from Last 3 Encounters:   06/06/19 97.9  F (36.6  C) (Oral)   02/14/19 96.3  F (35.7  C) (Oral)     BP Readings from Last 5 Encounters:   10/23/23 138/74   07/13/23 (!) 143/81   02/09/23 (!) 156/77   10/14/22 (!) 156/89   04/07/22 (!) 143/84     Pulse Readings from Last 1 Encounters:   10/23/23 71     Resp Readings from Last 1 Encounters:   No data found for Resp     Estimated body mass index is 26.89 kg/m  as calculated from the following:    Height as of 10/7/21: 1.575 m (5' 2\").    Weight as of this encounter: 66.7 kg (147 lb).       GEN: NAD. Healthy appearing adult.   HEENT:  Anicteric, noninjected sclera. No obvious external lesions of the ear and nose. Hearing intact.  CV: S1, S2. RRR. No m/r/g  PULM: No increased work of breathing. CTA bilaterally   MSK: MCPs, PIPs, DIPs without swelling or tenderness to palpation.  Wrists without swelling or tenderness to palpation.  Elbows and shoulders without swelling or tenderness to palpation.   Knees with medial joint line tenderness bilaterally but no effusion, increased warmth, or overlying erythema.  Ankles and MTPs without swelling or tenderness to palpation.    SKIN: No rash or jaundice seen  PSYCH: Alert. " Appropriate.       Labs / Imaging (select studies)     CBC  Recent Labs   Lab Test 10/19/23  1017 07/10/23  1020 04/03/23  1028 10/04/21  0913 07/08/21  0914 04/07/21  1316 09/25/20  0921   WBC 4.5 6.0 4.7   < > 5.1 7.5 6.6   RBC 4.37 3.82 4.21   < > 4.19 4.18 4.49   HGB 12.8 11.4* 12.5   < > 12.1 12.2 13.0   HCT 38.9 34.0* 37.0   < > 35.9 36.5 39.1   MCV 89 89 88   < > 86 87 87   RDW 14.3 14.6 15.1*   < > 15.1* 14.9 15.5*    212 226   < > 247 303 268   MCH 29.3 29.8 29.7   < > 28.9 29.2 29.0   MCHC 32.9 33.5 33.8   < > 33.7 33.4 33.2   NEUTROPHIL 59 66 55   < > 63.7 68.2 68.2   LYMPH 27 22 28   < > 23.7 20.6 22.3   MONOCYTE 12 11 15   < > 11.2 9.7 8.1   EOSINOPHIL 1 1 2   < > 1.2 1.2 1.1   BASOPHIL 1 0 0   < > 0.2 0.3 0.3   ANEU  --   --   --   --  3.3 5.2 4.5   ALYM  --   --   --   --  1.2 1.6 1.5   NGOC  --   --   --   --  0.6 0.7 0.5   AEOS  --   --   --   --  0.1 0.1 0.1   ABAS  --   --   --   --  0.0 0.0 0.0   ANEUTAUTO 2.6 4.0 2.6   < >  --   --   --    ALYMPAUTO 1.2 1.3 1.3   < >  --   --   --    AMONOAUTO 0.5 0.7 0.7   < >  --   --   --    AEOSAUTO 0.1 0.1 0.1   < >  --   --   --    ABSBASO 0.0 0.0 0.0   < >  --   --   --     < > = values in this interval not displayed.     CMP  Recent Labs   Lab Test 10/19/23  1017 09/07/23  1016 08/10/23  1018 07/10/23  1024 04/03/23  1028 10/04/21  0913 07/08/21  0914 04/07/21  1316 09/25/20  0921   CR 0.69  --   --  0.79 0.81   < > 0.81 0.84 0.86   GFRESTIMATED 85  --   --  73 71   < > 67 65 63   GFRESTBLACK  --   --   --   --   --   --  78 75 73   BILITOTAL 0.5 0.5 0.5 0.3 0.6   < > 0.5 0.4 0.5   ALBUMIN 4.2 4.2 4.2 4.1 4.3   < > 3.6 3.6 3.8   PROTTOTAL 7.3 7.4 7.2 6.8 7.3   < > 7.1 7.2 7.6   ALKPHOS 35 37 33* 30* 33*   < > 39* 38* 36*   AST 44 42 45 75* 48*   < > 22 22 22   ALT 36 28 34 93* 49*   < > 23 26 22    < > = values in this interval not displayed.     ESR/CRP  Recent Labs   Lab Test 10/19/23  1017 04/03/23  1028 01/05/23  1032 10/10/22  1018  04/05/22  1019 01/19/22  1035 10/04/21  0913   SED 25 22 25   < > 33* 29 51*   CRP  --   --   --   --  <2.9 <2.9 5.4   CRPI <3.00 <3.00 <3.00   < >  --   --   --     < > = values in this interval not displayed.     Hepatitis B  Recent Labs   Lab Test 09/25/20  0921 10/10/19  1118   HBCAB Nonreactive Nonreactive   HEPBANG Nonreactive  --      Hepatitis C  Recent Labs   Lab Test 07/07/22  1020   HCVAB Nonreactive     Immunization History     Immunization History   Administered Date(s) Administered    COVID-19 Bivalent 12+ (Pfizer) 11/18/2022, 05/08/2023    COVID-19 Monovalent 12+ (Pfizer 2022) 05/05/2022    COVID-19 Monovalent 18+ (Moderna) 02/25/2021, 03/25/2021, 11/12/2021    Influenza Vaccine 65+ (Fluzone HD) 10/14/2022, 10/23/2023          Chart documentation done in part with Dragon Voice recognition Software. Although reviewed after completion, some word and grammatical error may remain.    Phill Villa MD   viable infant, cephalic presentation, weight 3244g, APGARS 8/9  grossly normal uterus, b/l tubes and ovaries  dense adhesions involving the right adnexa and peritoneum, taken down with bovie  omental adhesion on anterior uterus, free tie applied and bovie used to lyse adhesion  hysterotomy closed in 1 layer with vicryl suture  arterial bleeding from left rectus abdominis, resolved after application of 3 figure-of-8 stitches with vicryl suture  surgicel powder applied to hysterotomy and rectus  peritoneum and rectus muscle reapproximated en bloc with vicryl suture    700/1500/200    Dictation #: viable infant, cephalic presentation, weight 3244g, APGARS 8/9  grossly normal uterus, b/l tubes and ovaries  dense adhesions involving the right adnexa and peritoneum, taken down with bovie  omental adhesion on anterior uterus, free tie applied and bovie used to lyse adhesion  hysterotomy closed in 1 layer with vicryl suture  arterial bleeding from left rectus abdominis, resolved after application of 3 figure-of-8 stitches with vicryl suture  surgicel powder applied to hysterotomy and rectus  interseed applied on hysterotomy and site of lysed omental adhesion on uterus  peritoneum and rectus muscle reapproximated en bloc with vicryl suture    700/1500/200    Dictation #: viable infant, cephalic presentation, weight 3244g, APGARS 8/9  grossly normal uterus, b/l tubes and ovaries  dense adhesions involving the right adnexa and peritoneum, taken down with bovie  omental adhesion on anterior uterus, free tie applied and bovie used to lyse adhesion  hysterotomy closed in 1 layer with vicryl suture  arterial bleeding from left rectus abdominis, resolved after application of 3 figure-of-8 stitches with vicryl suture  surgicel powder applied to hysterotomy and rectus  interseed applied on hysterotomy and site of lysed omental adhesion on uterus  peritoneum and rectus muscle reapproximated en bloc with vicryl suture    700/1500/200    Dictation #: [id number]

## 2023-10-31 ENCOUNTER — THERAPY VISIT (OUTPATIENT)
Dept: PHYSICAL THERAPY | Facility: CLINIC | Age: 83
End: 2023-10-31
Attending: INTERNAL MEDICINE
Payer: COMMERCIAL

## 2023-10-31 DIAGNOSIS — Z96.652 HX OF TOTAL KNEE ARTHROPLASTY, LEFT: ICD-10-CM

## 2023-10-31 DIAGNOSIS — M25.561 CHRONIC PAIN OF BOTH KNEES: ICD-10-CM

## 2023-10-31 DIAGNOSIS — R26.89 IMPAIRMENT OF BALANCE: Primary | ICD-10-CM

## 2023-10-31 DIAGNOSIS — M25.562 CHRONIC PAIN OF BOTH KNEES: ICD-10-CM

## 2023-10-31 DIAGNOSIS — G89.29 CHRONIC PAIN OF BOTH KNEES: ICD-10-CM

## 2023-10-31 PROCEDURE — 97110 THERAPEUTIC EXERCISES: CPT | Mod: GP

## 2023-10-31 PROCEDURE — 97161 PT EVAL LOW COMPLEX 20 MIN: CPT | Mod: GP

## 2023-10-31 NOTE — PROGRESS NOTES
"PHYSICAL THERAPY EVALUATION  Type of Visit: Evaluation    See electronic medical record for Abuse and Falls Screening details.    Subjective   Pt presents with report of bilateral knee pain, onset a couple months ago. They don't bother me all the time, but here and there. Had the left knee replaced 16 years ago. The left one feels a little worse \"but I worry about it more.\" I also have a problem with balance - decreased activity during COVID, so I have a harder time walking now. Likes to walk on the treadmill in the winter, and outside during the summer.  The reason I am here is to learn exercises to prevent it from getting worse, and work on my balance. Has had balance & knee PT in the past but \"I haven't been faithful with those.\"  Does report having falls on ice previously, but none in the last year.       Presenting condition or subjective complaint: Knees Bothering Me  Date of onset: 08/01/23    Relevant medical history: Arthritis; High blood pressure; Hearing problems; Migraines or headaches; Rheumatoid arthritis; Thyroid problems   Dates & types of surgery:  Left TKA 16 years ago. Thyroid & breast lump removal.    Prior diagnostic imaging/testing results: X-ray     Prior therapy history for the same diagnosis, illness or injury:   PT - not lately    Prior Level of Function  Transfers: Independent  Ambulation: Independent  ADL: Independent  IADL: Driving, Finances, Housekeeping, Laundry, Meal preparation, Medication management    Living Environment  Social support:   Family members (lives with daughter)  Type of home: House; 1 level   Stairs to enter the home: Yes 3 Is there a railing: Yes   Ramp:     Stairs inside the home: Yes 0 (full flight to basement)     Help at home:  daughter for yard work, shoveling etc.   Equipment owned: [employer]     Employment: No Retired  Hobbies/Interests: Reading & Walking    Patient goals for therapy: Walk better - ideally 1 hour per day.  Currently walking 30 mins per day " "(also limited by back/neck and balance).     Pain assessment: Pain denied     Objective   KNEE EVALUATION  PAIN: Pain Level at Rest: 0/10  Pain Level with Use: 3/10  Pain Location: global/general knees  Pain Quality: Aching  Pain Frequency: intermittent  Pain is Worst: nighttime  Pain is Exacerbated By: seems worse in bed to sleep - evening or during the night.   Pain is Relieved By: heat  Pain Progression: Unchanged    TUG: no AD. 12.17 then 11.60  30 sec STS: 10 reps - from standard chair, without hands. \"Little tiring\"    POSTURE:  significant thoracic kyphosis  GAIT:  Weightbearing Status:  FWB  Assistive Device(s): None  Gait Deviations:  non-antalgic. Slow gait speed, short step length/slight shuffling  BALANCE/PROPRIOCEPTION: Single Leg Stance Eyes Open (seconds): 10 sec L and 9 sec R - mild increased proprio challenge  WEIGHTBEARING ALIGNMENT: Knee WB Alignment:Genu valgus L  ROM:   (Degrees) Left AROM Left PROM  Right AROM Right PROM   Knee Flexion 110 115 125 130   Knee Extension 0 +3 -5 -3   Pain: slight with passive extension Right  End feel: capsular  Hip PROM: grossly WFL, report of mild knee pain with FADIR on right    STRENGTH:   Pain: - none + mild ++ moderate +++ severe  Strength Scale: 0-5/5 Left Right   Knee Flexion 4+ 4   Knee Extension 4 4   Quad Set Delayed, poor quality Delayed, poor quality     FLEXIBILITY: Decreased hamstrings L, Decreased hamstrings R  SPECIAL TESTS: NA  FUNCTIONAL TESTS: Double Leg Squat: Anterior knee translation, Impaired weight distribution, and decreased depth      Assessment & Plan   CLINICAL IMPRESSIONS  Medical Diagnosis: chronic pain of both knees    Treatment Diagnosis: bilateral knee pain; impaired balance   Impression/Assessment: Patient is a 83 year old female with bilateral knee complaints.  The following significant findings have been identified: Pain, Decreased ROM/flexibility, Decreased strength, Impaired balance, Edema, Impaired gait, Impaired muscle " performance, and Decreased activity tolerance. These impairments interfere with their ability to perform self care tasks, recreational activities, household chores, and community mobility as compared to previous level of function.     Clinical Decision Making (Complexity):  Clinical Presentation: Stable/Uncomplicated  Clinical Presentation Rationale: based on medical and personal factors listed in PT evaluation  Clinical Decision Making (Complexity): Low complexity    PLAN OF CARE  Treatment Interventions:  Interventions: Gait Training, Manual Therapy, Neuromuscular Re-education, Therapeutic Activity, Therapeutic Exercise    Long Term Goals     PT Goal 1  Goal Identifier: Ambulation  Goal Description: Pt will be able to walk at least 30 minutes not limited by knee pain  Rationale: to maximize safety and independence with performance of ADLs and functional tasks;to maximize safety and independence within the community  Target Date: 12/27/23  PT Goal 2  Goal Identifier: Sleeping  Goal Description: Pt will report no interruptions to sleep due to knee pain  Rationale: to maximize safety and independence with performance of ADLs and functional tasks (to promote restful sleep)  Target Date: 12/27/23      Frequency of Treatment: 1x/week tapering to 2x/month  Duration of Treatment: 2 months    Recommended Referrals to Other Professionals: NA  Education Assessment:        Risks and benefits of evaluation/treatment have been explained.   Patient/Family/caregiver agrees with Plan of Care.     Evaluation Time:     PT Eval, Low Complexity Minutes (84955): 25       Signing Clinician: Carolyn Waldrop PT      [facility]                                                                                   OUTPATIENT PHYSICAL THERAPY      PLAN OF TREATMENT FOR OUTPATIENT REHABILITATION   Patient's Last Name, First Name, Nelda Catalan YOB: 1940   Provider's Name   [facility]   Medical Record No.  [id number]     Onset Date: 08/01/23  Start of Care Date: 10/31/23     Medical Diagnosis:  chronic pain of both knees      PT Treatment Diagnosis:  bilateral knee pain; impaired balance Plan of Treatment  Frequency/Duration: 1x/week tapering to 2x/month/ 2 months    Certification date from 10/31/23 to 01/01/24         See note for plan of treatment details and functional goals     Carolyn Waldrop, PT                         I CERTIFY THE NEED FOR THESE SERVICES FURNISHED UNDER        THIS PLAN OF TREATMENT AND WHILE UNDER MY CARE     (Physician attestation of this document indicates review and certification of the therapy plan).                Referring Provider:  Phill Villa      Initial Assessment  See Epic Evaluation- Start of Care Date: 10/31/23

## 2023-11-01 PROBLEM — Z96.652 HX OF TOTAL KNEE ARTHROPLASTY, LEFT: Status: ACTIVE | Noted: 2023-11-01

## 2023-11-01 PROBLEM — M25.561 CHRONIC PAIN OF BOTH KNEES: Status: ACTIVE | Noted: 2023-11-01

## 2023-11-01 PROBLEM — R26.89 IMPAIRMENT OF BALANCE: Status: ACTIVE | Noted: 2023-11-01

## 2023-11-01 PROBLEM — M25.562 CHRONIC PAIN OF BOTH KNEES: Status: ACTIVE | Noted: 2023-11-01

## 2023-11-01 PROBLEM — G89.29 CHRONIC PAIN OF BOTH KNEES: Status: ACTIVE | Noted: 2023-11-01

## 2023-11-10 ENCOUNTER — THERAPY VISIT (OUTPATIENT)
Dept: PHYSICAL THERAPY | Facility: CLINIC | Age: 83
End: 2023-11-10
Attending: INTERNAL MEDICINE
Payer: COMMERCIAL

## 2023-11-10 DIAGNOSIS — M25.562 CHRONIC PAIN OF BOTH KNEES: ICD-10-CM

## 2023-11-10 DIAGNOSIS — R26.89 IMPAIRMENT OF BALANCE: Primary | ICD-10-CM

## 2023-11-10 DIAGNOSIS — G89.29 CHRONIC PAIN OF BOTH KNEES: ICD-10-CM

## 2023-11-10 DIAGNOSIS — M25.561 CHRONIC PAIN OF BOTH KNEES: ICD-10-CM

## 2023-11-10 PROCEDURE — 97110 THERAPEUTIC EXERCISES: CPT | Mod: GP

## 2023-11-10 PROCEDURE — 97112 NEUROMUSCULAR REEDUCATION: CPT | Mod: GP

## 2023-11-17 ENCOUNTER — THERAPY VISIT (OUTPATIENT)
Dept: PHYSICAL THERAPY | Facility: CLINIC | Age: 83
End: 2023-11-17
Attending: INTERNAL MEDICINE
Payer: COMMERCIAL

## 2023-11-17 DIAGNOSIS — M25.562 CHRONIC PAIN OF BOTH KNEES: Primary | ICD-10-CM

## 2023-11-17 DIAGNOSIS — M25.561 CHRONIC PAIN OF BOTH KNEES: Primary | ICD-10-CM

## 2023-11-17 DIAGNOSIS — R26.89 IMPAIRMENT OF BALANCE: ICD-10-CM

## 2023-11-17 DIAGNOSIS — G89.29 CHRONIC PAIN OF BOTH KNEES: Primary | ICD-10-CM

## 2023-11-17 PROCEDURE — 97110 THERAPEUTIC EXERCISES: CPT | Mod: GP

## 2023-11-17 PROCEDURE — 97112 NEUROMUSCULAR REEDUCATION: CPT | Mod: GP

## 2023-12-05 ENCOUNTER — THERAPY VISIT (OUTPATIENT)
Dept: PHYSICAL THERAPY | Facility: CLINIC | Age: 83
End: 2023-12-05
Payer: COMMERCIAL

## 2023-12-05 DIAGNOSIS — R26.89 IMPAIRMENT OF BALANCE: ICD-10-CM

## 2023-12-05 DIAGNOSIS — G89.29 CHRONIC PAIN OF BOTH KNEES: Primary | ICD-10-CM

## 2023-12-05 DIAGNOSIS — M25.561 CHRONIC PAIN OF BOTH KNEES: Primary | ICD-10-CM

## 2023-12-05 DIAGNOSIS — M25.562 CHRONIC PAIN OF BOTH KNEES: Primary | ICD-10-CM

## 2023-12-05 PROCEDURE — 97110 THERAPEUTIC EXERCISES: CPT | Mod: GP

## 2023-12-05 PROCEDURE — 97112 NEUROMUSCULAR REEDUCATION: CPT | Mod: GP

## 2023-12-19 ENCOUNTER — THERAPY VISIT (OUTPATIENT)
Dept: PHYSICAL THERAPY | Facility: CLINIC | Age: 83
End: 2023-12-19
Payer: COMMERCIAL

## 2023-12-19 DIAGNOSIS — R26.89 IMPAIRMENT OF BALANCE: ICD-10-CM

## 2023-12-19 DIAGNOSIS — G89.29 CHRONIC PAIN OF BOTH KNEES: Primary | ICD-10-CM

## 2023-12-19 DIAGNOSIS — M25.562 CHRONIC PAIN OF BOTH KNEES: Primary | ICD-10-CM

## 2023-12-19 DIAGNOSIS — M25.561 CHRONIC PAIN OF BOTH KNEES: Primary | ICD-10-CM

## 2023-12-19 PROCEDURE — 97110 THERAPEUTIC EXERCISES: CPT | Mod: GP

## 2023-12-19 PROCEDURE — 97112 NEUROMUSCULAR REEDUCATION: CPT | Mod: GP

## 2024-01-22 ENCOUNTER — LAB (OUTPATIENT)
Dept: LAB | Facility: CLINIC | Age: 84
End: 2024-01-22
Payer: COMMERCIAL

## 2024-01-22 DIAGNOSIS — Z79.899 HIGH RISK MEDICATION USE: ICD-10-CM

## 2024-01-22 DIAGNOSIS — M05.79 RHEUMATOID ARTHRITIS INVOLVING MULTIPLE SITES WITH POSITIVE RHEUMATOID FACTOR (H): ICD-10-CM

## 2024-01-22 LAB
ALBUMIN SERPL BCG-MCNC: 4 G/DL (ref 3.5–5.2)
ALP SERPL-CCNC: 33 U/L (ref 40–150)
ALT SERPL W P-5'-P-CCNC: 23 U/L (ref 0–50)
AST SERPL W P-5'-P-CCNC: 30 U/L (ref 0–45)
BASOPHILS # BLD AUTO: 0 10E3/UL (ref 0–0.2)
BASOPHILS NFR BLD AUTO: 1 %
BILIRUB DIRECT SERPL-MCNC: <0.2 MG/DL (ref 0–0.3)
BILIRUB SERPL-MCNC: 0.4 MG/DL
CREAT SERPL-MCNC: 0.81 MG/DL (ref 0.51–0.95)
EGFRCR SERPLBLD CKD-EPI 2021: 71 ML/MIN/1.73M2
EOSINOPHIL # BLD AUTO: 0.1 10E3/UL (ref 0–0.7)
EOSINOPHIL NFR BLD AUTO: 2 %
ERYTHROCYTE [DISTWIDTH] IN BLOOD BY AUTOMATED COUNT: 14.5 % (ref 10–15)
HCT VFR BLD AUTO: 37.5 % (ref 35–47)
HGB BLD-MCNC: 12.3 G/DL (ref 11.7–15.7)
IMM GRANULOCYTES # BLD: 0 10E3/UL
IMM GRANULOCYTES NFR BLD: 0 %
LYMPHOCYTES # BLD AUTO: 1.7 10E3/UL (ref 0.8–5.3)
LYMPHOCYTES NFR BLD AUTO: 33 %
MCH RBC QN AUTO: 29.4 PG (ref 26.5–33)
MCHC RBC AUTO-ENTMCNC: 32.8 G/DL (ref 31.5–36.5)
MCV RBC AUTO: 90 FL (ref 78–100)
MONOCYTES # BLD AUTO: 0.6 10E3/UL (ref 0–1.3)
MONOCYTES NFR BLD AUTO: 12 %
NEUTROPHILS # BLD AUTO: 2.7 10E3/UL (ref 1.6–8.3)
NEUTROPHILS NFR BLD AUTO: 53 %
PLATELET # BLD AUTO: 228 10E3/UL (ref 150–450)
PROT SERPL-MCNC: 6.9 G/DL (ref 6.4–8.3)
RBC # BLD AUTO: 4.19 10E6/UL (ref 3.8–5.2)
WBC # BLD AUTO: 5 10E3/UL (ref 4–11)

## 2024-01-22 PROCEDURE — 82565 ASSAY OF CREATININE: CPT

## 2024-01-22 PROCEDURE — 80076 HEPATIC FUNCTION PANEL: CPT

## 2024-01-22 PROCEDURE — 85025 COMPLETE CBC W/AUTO DIFF WBC: CPT

## 2024-01-22 PROCEDURE — 36415 COLL VENOUS BLD VENIPUNCTURE: CPT

## 2024-01-26 ENCOUNTER — THERAPY VISIT (OUTPATIENT)
Dept: PHYSICAL THERAPY | Facility: CLINIC | Age: 84
End: 2024-01-26
Payer: COMMERCIAL

## 2024-01-26 DIAGNOSIS — G89.29 CHRONIC PAIN OF BOTH KNEES: Primary | ICD-10-CM

## 2024-01-26 DIAGNOSIS — M25.562 CHRONIC PAIN OF BOTH KNEES: Primary | ICD-10-CM

## 2024-01-26 DIAGNOSIS — R26.89 IMPAIRMENT OF BALANCE: ICD-10-CM

## 2024-01-26 DIAGNOSIS — M25.561 CHRONIC PAIN OF BOTH KNEES: Primary | ICD-10-CM

## 2024-01-26 PROCEDURE — 97110 THERAPEUTIC EXERCISES: CPT | Mod: GP

## 2024-01-26 PROCEDURE — 97112 NEUROMUSCULAR REEDUCATION: CPT | Mod: GP

## 2024-01-26 NOTE — PROGRESS NOTES
[facility]                                                                                   OUTPATIENT PHYSICAL THERAPY    PLAN OF TREATMENT FOR OUTPATIENT REHABILITATION   Patient's Last Name, First Name, Nelda Catalan YOB: 1940   Provider's Name   [facility]   Medical Record No.  [id number]     Onset Date: 08/01/23  Start of Care Date: 10/31/23     Medical Diagnosis:  chronic pain of both knees      PT Treatment Diagnosis:  bilateral knee pain; impaired balance Plan of Treatment  Frequency/Duration: 1x visit to establish independent HEP/ 1x visit to establish independent HEP    Certification date from 01/26/24 to 01/26/24         See note for plan of treatment details and functional goals     Carolyn Waldrop PT                         I CERTIFY THE NEED FOR THESE SERVICES FURNISHED UNDER        THIS PLAN OF TREATMENT AND WHILE UNDER MY CARE     (Physician attestation of this document indicates review and certification of the therapy plan).              Referring Provider:  Phill Villa    Initial Assessment  See Epic Evaluation- Start of Care Date: 10/31/23            DISCHARGE  Reason for Discharge: Patient has met all goals. Patient is independent with home exercise program    Discharge Plan: Patient to continue home program.  Return to PT PRN    Referring Provider:  Phill Villa     01/26/24 0500   Appointment Info   Signing clinician's name / credentials Carolyn Waldrop PT DPT [facility]   Total/Authorized Visits 6 plan   Visits Used 6   Medical Diagnosis chronic pain of both knees   PT Tx Diagnosis bilateral knee pain; impaired balance   Quick Adds Certification   Progress Note/Certification   Start of Care Date 10/31/23   Onset of illness/injury or Date of Surgery 08/01/23   Therapy Frequency 1x visit to establish independent HEP   Predicted Duration 1x visit to establish independent HEP   Certification date from 01/26/24  "  Certification date to 01/26/24   Progress Note Completed Date 11/01/23   GOALS   PT Goals 2   PT Goal 1   Goal Identifier Ambulation   Goal Description Pt will be able to walk at least 30 minutes not limited by knee pain   Rationale to maximize safety and independence with performance of ADLs and functional tasks;to maximize safety and independence within the community   Goal Progress currently doing 20-30 minutes treadmill walking without pain or difficulty   Target Date 12/27/23   Date Met 01/26/24   PT Goal 2   Goal Identifier Sleeping   Goal Description Pt will report no interruptions to sleep due to knee pain   Rationale to maximize safety and independence with performance of ADLs and functional tasks  (to promote restful sleep)   Goal Progress no pain sleeping recently   Target Date 12/27/23   Date Met 12/05/23   Subjective Report   Subjective Report Doing well overall. The knee pain is well controlled, on average no pain and can do all her activities. Occasionally with rainy weather they will be sore & achy. She has been consistent with exercises and feels like they help both her walking & balance; some days she was busy & didn't do exercises and notices she wasn't as good when not doing them. Step-up exercises are the most difficult, \"makes me nervous\" but she can do it. She states her primary care suggested continuing balance treatment with [facility] [place].   Objective Measures   Objective Measures Objective Measure 1;Objective Measure 2;Objective Measure 3   Objective Measure 1   Objective Measure Balance   Details tandem-stance 30 sec EO bilat; SLS 10-15 sec each side; reports being able to do longer at home.   Objective Measure 2   Objective Measure Dynamic Balance   Details able to complete 6\" step-up independently without hand support   Treatment Interventions (PT)   Interventions Therapeutic Procedure/Exercise;Neuromuscular Re-education;Therapeutic Activity   Therapeutic " "Procedure/Exercise   Therapeutic Procedures: strength, endurance, ROM, flexibillity minutes (37822) 18   Therapeutic Procedures Ther Proc 2   Ther Proc 1 NuStep   Ther Proc 1 - Details level 6 x5 mins endurance/warmup   Ther Proc 2 TE   Ther Proc 2 - Details use of pictures & videos to review knee strengthening exercises. reinforce dosage & rationale for each. progress to adding weight. discussed self-management of HEP; independence with program; importance of continuing regularly   PTRx Ther Proc 1 Step-Up   PTRx Ther Proc 1 - Details fwd 6\" x15 B - does not use hand support, demo's good control but reports apprehension   PTRx Ther Proc 2 HS Curl   PTRx Ther Proc 2 - Details standing, 2x 10 B second set with 3# ankle weight - forgot to do this at home   Skilled Intervention exercise instruction/HEP management; progressed   Patient Response/Progress fatigued, tolerates well overall   Therapeutic Activity   Therapeutic Activities: dynamic activities to improve functional performance minutes (65931) 5   Therapeutic Activities Ther Act 2   Ther Act 1 Pt Ed   Ther Act 1 - Details time to discuss & collaborative planning on PT POC - difference between knee rehab & neuro/balance PT; pt would like to try [facility] [place] for now & will continue knee strength exercises   Skilled Intervention pt education   Ther Act 2 TA   Ther Act 2 - Details reassessment; POC & goals update; discussed independent self-management   Neuromuscular Re-education   Neuromuscular re-ed of mvmt, balance, coord, kinesthetic sense, posture, proprioception minutes (19066) 15   Neuromuscular Re-education Neuro Re-ed 2;Neuro Re-ed 3;Neuro Re-ed 4;Neuro Re-ed 5   Neuro Re-ed 1 Semi Tandem   Neuro Re-ed 1 - Details 4x 30 sec each side with head turns, EC. extra time to instruct safety & different challenges for home   Neuro Re-ed 2 Single Leg Cone Taps   Neuro Re-ed 2 - Details alternating 2x 90 sec - inst in progressions for home   Neuro " Re-ed 3 Tandem Stance   Neuro Re-ed 3 - Details 2x 30 sec each side no head turns   Neuro Re-ed 4 One Leg Stands 30 Seconds   Neuro Re-ed 4 - Details 2x to tolerance each leg - achieves 15 sec max today   Neuro Re-ed 5 Marching in Place   Neuro Re-ed 5 - Details x60 sec - emphasis on control, return with control remains challenging   Skilled Intervention graded balance challenges working on weight shift & foot clearance   Patient Response/Progress continues to demonstrate good control/stability; moderately challenging   Plan   Home program pictures   Updates to plan of care d/c with HEP   Plan for next session d/c with HEP   Total Session Time   Timed Code Treatment Minutes 38   Total Treatment Time (sum of timed and untimed services) 38

## 2024-02-21 DIAGNOSIS — M05.79 RHEUMATOID ARTHRITIS INVOLVING MULTIPLE SITES WITH POSITIVE RHEUMATOID FACTOR (H): ICD-10-CM

## 2024-02-21 NOTE — TELEPHONE ENCOUNTER
Requested Prescriptions   Pending Prescriptions Disp Refills    methotrexate 2.5 MG tablet 78 tablet 0     Sig: Take 6 tablets (15 mg) by mouth once a week . This is a once weekly medication, taken on the same day of each week.       There is no refill protocol information for this order        Last Written Prescription Date:  10/23/2023  Last Fill Quantity: 78,  # refills: 0   Last office visit: 10/23/2023  Future Office Visit:   Next 5 appointments (look out 90 days)      Apr 22, 2024 10:20 AM  (Arrive by 10:05 AM)  Return Visit with Phill Villa MD  [facility] ([facility] ) [address] [place] [id number]  [phone]             Labs 1/22/2024    Briana ERICKSON, Specialty RN 2/21/2024 10:37 AM

## 2024-02-22 RX ORDER — METHOTREXATE 2.5 MG/1
15 TABLET ORAL WEEKLY
Qty: 78 TABLET | Refills: 0 | Status: SHIPPED | OUTPATIENT
Start: 2024-02-22 | End: 2024-04-22

## 2024-02-22 NOTE — TELEPHONE ENCOUNTER
Signed Prescriptions:                        Disp   Refills    methotrexate 2.5 MG tablet                 78 tab*0        Sig: Take 6 tablets (15 mg) by mouth once a week . This is           a once weekly medication, taken on the same day           of each week.  Authorizing Provider: YASMIN BREWER  Ordering User: DUSTIN MOORE    Patient up to date on labs and future appointment scheduled 4/22/24. Will fill until next appointment.     Dustin DUQUE RN, Specialty Clinic 02/22/24 11:03 AM

## 2024-04-15 ENCOUNTER — LAB (OUTPATIENT)
Dept: LAB | Facility: CLINIC | Age: 84
End: 2024-04-15
Payer: COMMERCIAL

## 2024-04-15 DIAGNOSIS — Z79.899 HIGH RISK MEDICATION USE: ICD-10-CM

## 2024-04-15 DIAGNOSIS — M05.79 RHEUMATOID ARTHRITIS INVOLVING MULTIPLE SITES WITH POSITIVE RHEUMATOID FACTOR (H): ICD-10-CM

## 2024-04-15 LAB
BASOPHILS # BLD AUTO: 0 10E3/UL (ref 0–0.2)
BASOPHILS NFR BLD AUTO: 1 %
EOSINOPHIL # BLD AUTO: 0.1 10E3/UL (ref 0–0.7)
EOSINOPHIL NFR BLD AUTO: 3 %
ERYTHROCYTE [DISTWIDTH] IN BLOOD BY AUTOMATED COUNT: 15.2 % (ref 10–15)
ERYTHROCYTE [SEDIMENTATION RATE] IN BLOOD BY WESTERGREN METHOD: 29 MM/HR (ref 0–30)
HCT VFR BLD AUTO: 37.1 % (ref 35–47)
HGB BLD-MCNC: 12 G/DL (ref 11.7–15.7)
IMM GRANULOCYTES # BLD: 0 10E3/UL
IMM GRANULOCYTES NFR BLD: 0 %
LYMPHOCYTES # BLD AUTO: 1.3 10E3/UL (ref 0.8–5.3)
LYMPHOCYTES NFR BLD AUTO: 28 %
MCH RBC QN AUTO: 29.9 PG (ref 26.5–33)
MCHC RBC AUTO-ENTMCNC: 32.3 G/DL (ref 31.5–36.5)
MCV RBC AUTO: 92 FL (ref 78–100)
MONOCYTES # BLD AUTO: 0.5 10E3/UL (ref 0–1.3)
MONOCYTES NFR BLD AUTO: 10 %
NEUTROPHILS # BLD AUTO: 2.8 10E3/UL (ref 1.6–8.3)
NEUTROPHILS NFR BLD AUTO: 59 %
PLATELET # BLD AUTO: 238 10E3/UL (ref 150–450)
RBC # BLD AUTO: 4.02 10E6/UL (ref 3.8–5.2)
WBC # BLD AUTO: 4.7 10E3/UL (ref 4–11)

## 2024-04-15 PROCEDURE — 80076 HEPATIC FUNCTION PANEL: CPT

## 2024-04-15 PROCEDURE — 82565 ASSAY OF CREATININE: CPT

## 2024-04-15 PROCEDURE — 86140 C-REACTIVE PROTEIN: CPT

## 2024-04-15 PROCEDURE — 36415 COLL VENOUS BLD VENIPUNCTURE: CPT

## 2024-04-15 PROCEDURE — 85652 RBC SED RATE AUTOMATED: CPT

## 2024-04-15 PROCEDURE — 85025 COMPLETE CBC W/AUTO DIFF WBC: CPT

## 2024-04-16 LAB
ALBUMIN SERPL BCG-MCNC: 3.9 G/DL (ref 3.5–5.2)
ALP SERPL-CCNC: 35 U/L (ref 40–150)
ALT SERPL W P-5'-P-CCNC: 27 U/L (ref 0–50)
AST SERPL W P-5'-P-CCNC: 31 U/L (ref 0–45)
BILIRUB DIRECT SERPL-MCNC: <0.2 MG/DL (ref 0–0.3)
BILIRUB SERPL-MCNC: 0.4 MG/DL
CREAT SERPL-MCNC: 0.77 MG/DL (ref 0.51–0.95)
CRP SERPL-MCNC: <3 MG/L
EGFRCR SERPLBLD CKD-EPI 2021: 75 ML/MIN/1.73M2
PROT SERPL-MCNC: 7 G/DL (ref 6.4–8.3)

## 2024-04-22 ENCOUNTER — VIRTUAL VISIT (OUTPATIENT)
Dept: RHEUMATOLOGY | Facility: CLINIC | Age: 84
End: 2024-04-22
Payer: COMMERCIAL

## 2024-04-22 DIAGNOSIS — M05.79 RHEUMATOID ARTHRITIS INVOLVING MULTIPLE SITES WITH POSITIVE RHEUMATOID FACTOR (H): Primary | ICD-10-CM

## 2024-04-22 DIAGNOSIS — Z79.899 HIGH RISK MEDICATION USE: ICD-10-CM

## 2024-04-22 PROCEDURE — 99442 PR PHYSICIAN TELEPHONE EVALUATION 11-20 MIN: CPT | Performed by: INTERNAL MEDICINE

## 2024-04-22 RX ORDER — METHOTREXATE 2.5 MG/1
15 TABLET ORAL WEEKLY
Qty: 78 TABLET | Refills: 1 | Status: SHIPPED | OUTPATIENT
Start: 2024-04-22

## 2024-04-22 RX ORDER — FOLIC ACID 1 MG/1
1 TABLET ORAL DAILY
Qty: 90 TABLET | Refills: 2 | Status: SHIPPED | OUTPATIENT
Start: 2024-04-22

## 2024-04-22 NOTE — PROGRESS NOTES
Nelda Palma is a 83 year old year old who is being evaluated via a billable telephone visit.      What phone number would you like to be contacted at? [phone]   How would you like to obtain your AVS? [facility]     Rheumatology Telephone/Telehealth Visit      Nelda Palma MRN# [id number]   YOB: 1940 Age: 83 year old      Date of visit: 4/22/24   PCP: Dr. Dennise Dye at the [facility]  Endocrinologist: Dr. Aditya De Leon at [facility].     Chief Complaint   Patient presents with:  Rheumatoid Arthritis    Assessment and Plan     1.  Seropositive erosive rheumatoid arthritis (, CCP >2776.8): Reportedly diagnosed in 2004.  Reportedly on hydroxychloroquine in the past that was effective and slowly tapered off.  Note that the patient reports her ophthalmologist telling her that Plaquenil can be very toxic to her eyes and the patient is under the impression from her ophthalmologist that she is to avoid Plaquenil, but I do not see records documenting a contraindication for Plaquenil use so if it is needed again in the future then we will need ophthalmology clearance.  Currently on methotrexate 15 mg once weekly (transaminitis with higher doses).  Doing well with regard to RA.   Chronic illness, stable  - Continue methotrexate 15 mg once weekly  - Continue folic acid 1mg daily  - Labs in 3 months: CBC, Creatinine, Hepatic Panel  - Labs in 6 months: CBC, Creatinine, Hepatic Panel, ESR, CRP     High risk medication requiring intensive toxicity monitoring at least quarterly     2.  Nonradiating neck pain in the setting of RA: reportedly a chronic issue for years.  No instability or degenerative change seen on 2/14/2019 x-ray.  Significant improvement with physical therapy exercises and does well as long as she continues these exercises on her own at home.    3. Bilateral knee pain: Patellofemoral syndrome clinically; history of left TKA.  Significant improvement with physical therapy  exercises and she does well as long as she continues exercises for her knees regularly.  She will continue exercising on her own at home.     4.  Chronic low back pain: Patient reports that she saw a surgeon who said that there was no treatment that could be offered other than physical therapy so she is going to [facility] for physical therapy every other week at the direction of her spine surgeon    5.  Right greater trochanteric bursitis: Based on description from patient.  Worse when laying on the right side and reproduced by self-palpation over the right greater trochanteric bursa.  Discussed option for steroid injection.  Suspect that knee and low back altered gait that affected the bursa.  She says that she will monitor at, try ice/heat, and continue to work on her knees and low back with physical therapy.  If symptoms worsen then she will consider steroid injection in the future.    6. Bilateral hand osteoarthritis: Stable    7. Osteoporosis: Managed by her endocrinologist, Dr. Aditya De Leon at [facility].     8.  Vaccinations: Vaccinations reviewed with Ms. Palma.  - Influenza: Advised yearly vaccination, and to hold methotrexate for 1 week afterward  - Shingrix: Up to date   - COVID-19: Advised keeping updated, and to hold methotrexate for 1-2 weeks afterward; due to updating now  - Tetanus: Advised updating     Total minutes spent in evaluation with patient, documentation, , and review of pertinent studies and chart notes: 16  The longitudinal plan of care for the rheumatology problem(s) were addressed during this visit.  Due to added complexity of care, we will continue to support the patient and the subsequent management of this condition with ongoing continuity of care.        Ms. Palma verbalized agreement with and understanding of the rational for the diagnosis and treatment plan.  All questions were answered to best of my ability and the patient's satisfaction. Ms. Palma was advised to  contact the clinic with any questions that may arise after the clinic visit.      Thank you for involving me in the care of the patient    Return to clinic: 3 months      HPI   Nelda Palma is a 83 year old female with a past medical history significant for hypertension, left TKA (2007), migraines, rosacea, bilateral cataract surgery in 2009, hypothyroidism, senile osteoporosis, and rheumatoid arthritis who is seen for follow-up of RA.      7/13/2023: Return of neck pain since she has not been doing physical therapy exercises on her own at home.  She plans to restart physical therapy exercises for her neck.  She has been following with her primary care provider for chronic neck pain.  Rheumatoid arthritis well controlled.  States that methotrexate sometimes causes mild stomach upset so she was thinking about splitting the dose up to be 4 tablets in the morning and 4 tablets in the evening on the same day of each week.  Morning stiffness for no more than 5-10 minutes.  Sedentary lifestyle; sometimes walks for exercise.    10/23/2023: Bilateral knee pain since she has reduced her physical activity.  She says she is not walking much because she is lazy.  Knee pain improved with physical therapy in the past but she is not doing those exercises now.  No other joint pain.  Morning stiffness for no more than 5-10 minutes.  Taking methotrexate 15 mg once weekly.  No neck pain currently.    Today, 4/22/2024: Reports doing well.  Went to see a spine surgeon regarding chronic degenerative low back pain where she was told that no intervention could be done and therefore she should focus on physical therapy so she has been going to  [facility] for physical therapy, with a plan to go to PT every other week.  Knees are doing well at this time; she says her knees do well as long as she exercises regularly.  Right greater trochanteric bursa pain that is worse when she lays on the right side and she can reproduce the pain by  self-palpation over the greater trochanteric bursa.  RA has been stable.  Taking methotrexate 15 mg once weekly and folic acid 1 mg daily.    Denies fevers, chills, nausea, vomiting, constipation, diarrhea. No abdominal pain. No chest pain/pressure, palpitations, or shortness of breath. No LE swelling. No oral or nasal sores.  No rash. No sicca symptoms.     Tobacco: none  EtOH: none  Drugs: none  Occupation: [employer], now retired    ROS   12 point review of system was completed and negative except as noted in the HPI     Active Problem List     Patient Active Problem List   Diagnosis    Actinic keratosis    Essential hypertension    GA (granuloma annulare)    Headache    Hypothyroidism    Meningioma (H)    Rheumatoid arthritis involving both hands with positive rheumatoid factor (H)    Rosacea    Senile osteoporosis    Sensorineural hearing loss, bilateral    Neck pain    Chronic pain of both knees    Hx of total knee arthroplasty, left    Impairment of balance     Past Medical History   See above    Past Surgical History   Left TKA    Allergy     Allergies   Allergen Reactions    Oxycodone-Acetaminophen Anxiety     After TKA she developed marked post operative anxiety felt to be due to the Percocet; took it again after going home and symptoms recurred.    Penicillins Rash     Current Medication List     Current Outpatient Medications   Medication Sig Dispense Refill    amLODIPine (NORVASC) 5 MG tablet Take 1 tablet by mouth daily      CALCIUM CITRATE-VITAMIN D PO       denosumab (PROLIA) 60 MG/ML SOSY injection Inject 60 mg Subcutaneous      diclofenac (VOLTAREN) 1 % topical gel Apply up to 4 times per day to the hands as needed; no more than 2 grams per application to the upper extremities. (Patient not taking: Reported on 2/9/2023) 200 g 1    folic acid (FOLVITE) 1 MG tablet Take 1 tablet (1 mg) by mouth daily 90 tablet 2    hydrochlorothiazide (HYDRODIURIL) 25 MG tablet Take 25 mg by mouth  "daily  3    levothyroxine (SYNTHROID/LEVOTHROID) 75 MCG tablet TAKE 1 TABLET (75 MCG) BY MOUTH BEFORE BREAKFAST.      losartan (COZAAR) 50 MG tablet Take 75 mg by mouth daily      magnesium 250 MG tablet Take 1 tablet by mouth daily      methotrexate 2.5 MG tablet Take 6 tablets (15 mg) by mouth once a week . This is a once weekly medication, taken on the same day of each week. 78 tablet 0    Vitamin D, Cholecalciferol, 400 units TABS        No current facility-administered medications for this visit.     Social History   See HPI    Family History     Family History   Problem Relation Age of Onset    Diabetes Mother     Diabetes Father        Physical Exam     Temp Readings from Last 3 Encounters:   06/06/19 97.9  F (36.6  C) (Oral)   02/14/19 96.3  F (35.7  C) (Oral)     BP Readings from Last 5 Encounters:   10/23/23 138/74   07/13/23 (!) 143/81   02/09/23 (!) 156/77   10/14/22 (!) 156/89   04/07/22 (!) 143/84     Pulse Readings from Last 1 Encounters:   10/23/23 71     Resp Readings from Last 1 Encounters:   No data found for Resp     Estimated body mass index is 26.89 kg/m  as calculated from the following:    Height as of 10/7/21: 1.575 m (5' 2\").    Weight as of 10/23/23: 66.7 kg (147 lb).     GEN: alert and no distress  PSYCH: Alert; coherent speech, normal rate and volume, able to articulate logical thoughts, able   to abstract reason, no tangential thoughts. Normal affect.   RESP: No cough, no audible wheezing, able to talk in full sentences  Remainder of exam unable to be completed due to telephone visits      Labs / Imaging (select studies)     CBC  Recent Labs   Lab Test 04/15/24  1018 01/22/24  1016 10/19/23  1017 10/04/21  0913 07/08/21  0914 04/07/21  1316 09/25/20  0921   WBC 4.7 5.0 4.5   < > 5.1 7.5 6.6   RBC 4.02 4.19 4.37   < > 4.19 4.18 4.49   HGB 12.0 12.3 12.8   < > 12.1 12.2 13.0   HCT 37.1 37.5 38.9   < > 35.9 36.5 39.1   MCV 92 90 89   < > 86 87 87   RDW 15.2* 14.5 14.3   < > 15.1* 14.9 " 15.5*    228 223   < > 247 303 268   MCH 29.9 29.4 29.3   < > 28.9 29.2 29.0   MCHC 32.3 32.8 32.9   < > 33.7 33.4 33.2   NEUTROPHIL 59 53 59   < > 63.7 68.2 68.2   LYMPH 28 33 27   < > 23.7 20.6 22.3   MONOCYTE 10 12 12   < > 11.2 9.7 8.1   EOSINOPHIL 3 2 1   < > 1.2 1.2 1.1   BASOPHIL 1 1 1   < > 0.2 0.3 0.3   ANEU  --   --   --   --  3.3 5.2 4.5   ALYM  --   --   --   --  1.2 1.6 1.5   NGOC  --   --   --   --  0.6 0.7 0.5   AEOS  --   --   --   --  0.1 0.1 0.1   ABAS  --   --   --   --  0.0 0.0 0.0   ANEUTAUTO 2.8 2.7 2.6   < >  --   --   --    ALYMPAUTO 1.3 1.7 1.2   < >  --   --   --    AMONOAUTO 0.5 0.6 0.5   < >  --   --   --    AEOSAUTO 0.1 0.1 0.1   < >  --   --   --    ABSBASO 0.0 0.0 0.0   < >  --   --   --     < > = values in this interval not displayed.     CMP  Recent Labs   Lab Test 04/15/24  1018 01/22/24  1016 10/19/23  1017 10/04/21  0913 07/08/21  0914 04/07/21  1316 09/25/20  0921   CR 0.77 0.81 0.69   < > 0.81 0.84 0.86   GFRESTIMATED 75 71 85   < > 67 65 63   GFRESTBLACK  --   --   --   --  78 75 73   BILITOTAL 0.4 0.4 0.5   < > 0.5 0.4 0.5   ALBUMIN 3.9 4.0 4.2   < > 3.6 3.6 3.8   PROTTOTAL 7.0 6.9 7.3   < > 7.1 7.2 7.6   ALKPHOS 35* 33* 35   < > 39* 38* 36*   AST 31 30 44   < > 22 22 22   ALT 27 23 36   < > 23 26 22    < > = values in this interval not displayed.     ESR/CRP  Recent Labs   Lab Test 04/15/24  1018 10/19/23  1017 04/03/23  1028 10/10/22  1018 04/05/22  1019 01/19/22  1035 10/04/21  0913   SED 29 25 22   < > 33* 29 51*   CRP  --   --   --   --  <2.9 <2.9 5.4   CRPI <3.00 <3.00 <3.00   < >  --   --   --     < > = values in this interval not displayed.       Hepatitis B  Recent Labs   Lab Test 09/25/20  0921 10/10/19  1118   HBCAB Nonreactive Nonreactive   HEPBANG Nonreactive  --      Hepatitis C  Recent Labs   Lab Test 07/07/22  1020   HCVAB Nonreactive       Immunization History     Immunization History   Administered Date(s) Administered    COVID-19 12+ (2023-24)  (Pfizer) 11/09/2023    COVID-19 Bivalent 12+ (Pfizer) 11/18/2022, 05/08/2023    COVID-19 Monovalent 12+ (Pfizer 2022) 05/05/2022    COVID-19 Monovalent 18+ (Moderna) 02/25/2021, 03/25/2021, 11/12/2021    Influenza Vaccine 65+ (Fluzone HD) 10/14/2022, 10/23/2023          Chart documentation done in part with Dragon Voice recognition Software. Although reviewed after completion, some word and grammatical error may remain.      Phone call duration with patient (in minutes): 13    Location of patient: [place], [place]   Location of provider: Off-site, Josesito Villa MD

## 2024-07-15 ENCOUNTER — LAB (OUTPATIENT)
Dept: LAB | Facility: CLINIC | Age: 84
End: 2024-07-15
Payer: COMMERCIAL

## 2024-07-15 DIAGNOSIS — M05.79 RHEUMATOID ARTHRITIS INVOLVING MULTIPLE SITES WITH POSITIVE RHEUMATOID FACTOR (H): ICD-10-CM

## 2024-07-15 DIAGNOSIS — Z79.899 HIGH RISK MEDICATION USE: ICD-10-CM

## 2024-07-15 LAB
BASOPHILS # BLD AUTO: 0 10E3/UL (ref 0–0.2)
BASOPHILS NFR BLD AUTO: 1 %
EOSINOPHIL # BLD AUTO: 0.1 10E3/UL (ref 0–0.7)
EOSINOPHIL NFR BLD AUTO: 2 %
ERYTHROCYTE [DISTWIDTH] IN BLOOD BY AUTOMATED COUNT: 14.9 % (ref 10–15)
HCT VFR BLD AUTO: 36.9 % (ref 35–47)
HGB BLD-MCNC: 11.8 G/DL (ref 11.7–15.7)
IMM GRANULOCYTES # BLD: 0 10E3/UL
IMM GRANULOCYTES NFR BLD: 0 %
LYMPHOCYTES # BLD AUTO: 1.6 10E3/UL (ref 0.8–5.3)
LYMPHOCYTES NFR BLD AUTO: 32 %
MCH RBC QN AUTO: 29.1 PG (ref 26.5–33)
MCHC RBC AUTO-ENTMCNC: 32 G/DL (ref 31.5–36.5)
MCV RBC AUTO: 91 FL (ref 78–100)
MONOCYTES # BLD AUTO: 0.5 10E3/UL (ref 0–1.3)
MONOCYTES NFR BLD AUTO: 10 %
NEUTROPHILS # BLD AUTO: 2.8 10E3/UL (ref 1.6–8.3)
NEUTROPHILS NFR BLD AUTO: 55 %
PLATELET # BLD AUTO: 200 10E3/UL (ref 150–450)
RBC # BLD AUTO: 4.06 10E6/UL (ref 3.8–5.2)
WBC # BLD AUTO: 5.1 10E3/UL (ref 4–11)

## 2024-07-15 PROCEDURE — 36415 COLL VENOUS BLD VENIPUNCTURE: CPT

## 2024-07-15 PROCEDURE — 82565 ASSAY OF CREATININE: CPT

## 2024-07-15 PROCEDURE — 80076 HEPATIC FUNCTION PANEL: CPT

## 2024-07-15 PROCEDURE — 85025 COMPLETE CBC W/AUTO DIFF WBC: CPT

## 2024-07-16 LAB
ALBUMIN SERPL BCG-MCNC: 4.1 G/DL (ref 3.5–5.2)
ALP SERPL-CCNC: 31 U/L (ref 40–150)
ALT SERPL W P-5'-P-CCNC: 17 U/L (ref 0–50)
AST SERPL W P-5'-P-CCNC: 28 U/L (ref 0–45)
BILIRUB DIRECT SERPL-MCNC: <0.2 MG/DL (ref 0–0.3)
BILIRUB SERPL-MCNC: 0.3 MG/DL
CREAT SERPL-MCNC: 0.91 MG/DL (ref 0.51–0.95)
EGFRCR SERPLBLD CKD-EPI 2021: 62 ML/MIN/1.73M2
PROT SERPL-MCNC: 7.1 G/DL (ref 6.4–8.3)

## 2024-07-31 NOTE — TELEPHONE ENCOUNTER
Airway    Performed by: Alexa Davidson CRNA  Authorized by: Joni Vazquez MD    Final Airway Type:  Endotracheal airway  Final Endotracheal Airway*:  ETT  ETT Size (mm)*:  7.0  Cuff*:  Regular  Technique Used for Successful ETT Placement:  Direct laryngoscopy  Devices/Methods Used in Placement*:  Mask and Stylet  Intubation Procedure*:  Preoxygenation, ETCO2, Atraumatic, Dentition Unchanged and Pharynx Clear  Insertion Site:  Oral  Blade Type*:  Cruz  Blade Size*:  2  Cuff Volume (mL):  8  Measured from*:  Lips  Secured at (cm)*:  22  Placement Verified by: auscultation and capnometry    Glottic View*:  1 - full view of glottis  Attempts*:  1  Ventilation Between Attempts:  Bag valve  Number of Other Approaches Attempted:  0   Patient Identified, Procedure confirmed, Emergency equipment available and Safety protocols followed  Location:  OR  Urgency:  Elective  Difficult Airway: No    Indications for Airway Management:  Anesthesia  Spontaneous Ventilation: absent    Sedation Level:  Anesthetized  Mask Difficulty Assessment:  1 - vent by mask  Start Time: 7/31/2024 8:44 AM       Patient calling. She needs the number to schedule at [facility]. Please call her with the number.

## 2024-10-10 DIAGNOSIS — M05.79 RHEUMATOID ARTHRITIS INVOLVING MULTIPLE SITES WITH POSITIVE RHEUMATOID FACTOR (H): ICD-10-CM

## 2024-10-10 NOTE — TELEPHONE ENCOUNTER
Medication:   Methotrexate 2.5mg  Last written on:   04/22/2024  Quantity:   78    Refills:   1    Last office visit:   04/22/2024  Next office visit:   10/21/2024  Last labs:   07/15/2024

## 2024-10-14 ENCOUNTER — LAB (OUTPATIENT)
Dept: LAB | Facility: CLINIC | Age: 84
End: 2024-10-14
Payer: COMMERCIAL

## 2024-10-14 DIAGNOSIS — Z79.899 HIGH RISK MEDICATION USE: ICD-10-CM

## 2024-10-14 DIAGNOSIS — M05.79 RHEUMATOID ARTHRITIS INVOLVING MULTIPLE SITES WITH POSITIVE RHEUMATOID FACTOR (H): ICD-10-CM

## 2024-10-14 LAB
ALBUMIN SERPL BCG-MCNC: 4.4 G/DL (ref 3.5–5.2)
ALP SERPL-CCNC: 35 U/L (ref 40–150)
ALT SERPL W P-5'-P-CCNC: 18 U/L (ref 0–50)
AST SERPL W P-5'-P-CCNC: 26 U/L (ref 0–45)
BASOPHILS # BLD AUTO: 0 10E3/UL (ref 0–0.2)
BASOPHILS NFR BLD AUTO: 0 %
BILIRUB DIRECT SERPL-MCNC: <0.2 MG/DL (ref 0–0.3)
BILIRUB SERPL-MCNC: 0.4 MG/DL
CREAT SERPL-MCNC: 0.82 MG/DL (ref 0.51–0.95)
CRP SERPL-MCNC: <3 MG/L
EGFRCR SERPLBLD CKD-EPI 2021: 69 ML/MIN/1.73M2
EOSINOPHIL # BLD AUTO: 0.1 10E3/UL (ref 0–0.7)
EOSINOPHIL NFR BLD AUTO: 2 %
ERYTHROCYTE [DISTWIDTH] IN BLOOD BY AUTOMATED COUNT: 14.8 % (ref 10–15)
ERYTHROCYTE [SEDIMENTATION RATE] IN BLOOD BY WESTERGREN METHOD: 26 MM/HR (ref 0–30)
HCT VFR BLD AUTO: 38.9 % (ref 35–47)
HGB BLD-MCNC: 12.5 G/DL (ref 11.7–15.7)
IMM GRANULOCYTES # BLD: 0 10E3/UL
IMM GRANULOCYTES NFR BLD: 0 %
LYMPHOCYTES # BLD AUTO: 1.5 10E3/UL (ref 0.8–5.3)
LYMPHOCYTES NFR BLD AUTO: 24 %
MCH RBC QN AUTO: 29.4 PG (ref 26.5–33)
MCHC RBC AUTO-ENTMCNC: 32.1 G/DL (ref 31.5–36.5)
MCV RBC AUTO: 92 FL (ref 78–100)
MONOCYTES # BLD AUTO: 0.6 10E3/UL (ref 0–1.3)
MONOCYTES NFR BLD AUTO: 9 %
NEUTROPHILS # BLD AUTO: 3.9 10E3/UL (ref 1.6–8.3)
NEUTROPHILS NFR BLD AUTO: 65 %
PLATELET # BLD AUTO: 222 10E3/UL (ref 150–450)
PROT SERPL-MCNC: 7.5 G/DL (ref 6.4–8.3)
RBC # BLD AUTO: 4.25 10E6/UL (ref 3.8–5.2)
WBC # BLD AUTO: 6 10E3/UL (ref 4–11)

## 2024-10-14 PROCEDURE — 80076 HEPATIC FUNCTION PANEL: CPT

## 2024-10-14 PROCEDURE — 36415 COLL VENOUS BLD VENIPUNCTURE: CPT

## 2024-10-14 PROCEDURE — 86140 C-REACTIVE PROTEIN: CPT

## 2024-10-14 PROCEDURE — 82565 ASSAY OF CREATININE: CPT

## 2024-10-14 PROCEDURE — 85025 COMPLETE CBC W/AUTO DIFF WBC: CPT

## 2024-10-14 PROCEDURE — 85652 RBC SED RATE AUTOMATED: CPT

## 2024-10-16 RX ORDER — METHOTREXATE 2.5 MG/1
15 TABLET ORAL WEEKLY
Qty: 78 TABLET | Refills: 0 | Status: SHIPPED | OUTPATIENT
Start: 2024-10-16 | End: 2024-10-21

## 2024-10-16 NOTE — TELEPHONE ENCOUNTER
After chart review and based on prior discussion with MD it was noted that this is appropriate for a refill.    KINGA CabreraN RN Specialty Triage 10/16/2024 1:51 PM

## 2024-10-16 NOTE — TELEPHONE ENCOUNTER
Labs done 10-.  Abi Sauceda CMA Rheumatology  10/16/2024      Skin normal color for race, warm, dry and intact. No evidence of rash.

## 2024-10-21 ENCOUNTER — OFFICE VISIT (OUTPATIENT)
Dept: RHEUMATOLOGY | Facility: CLINIC | Age: 84
End: 2024-10-21
Payer: COMMERCIAL

## 2024-10-21 VITALS
BODY MASS INDEX: 25.97 KG/M2 | HEART RATE: 70 BPM | SYSTOLIC BLOOD PRESSURE: 136 MMHG | WEIGHT: 142 LBS | DIASTOLIC BLOOD PRESSURE: 78 MMHG | OXYGEN SATURATION: 99 %

## 2024-10-21 DIAGNOSIS — M05.79 RHEUMATOID ARTHRITIS INVOLVING MULTIPLE SITES WITH POSITIVE RHEUMATOID FACTOR (H): Primary | ICD-10-CM

## 2024-10-21 DIAGNOSIS — M17.0 PRIMARY OSTEOARTHRITIS OF BOTH KNEES: ICD-10-CM

## 2024-10-21 DIAGNOSIS — M70.50 PES ANSERINE BURSITIS: ICD-10-CM

## 2024-10-21 DIAGNOSIS — Z79.899 HIGH RISK MEDICATION USE: ICD-10-CM

## 2024-10-21 DIAGNOSIS — M70.61 GREATER TROCHANTERIC BURSITIS OF RIGHT HIP: ICD-10-CM

## 2024-10-21 PROCEDURE — G2211 COMPLEX E/M VISIT ADD ON: HCPCS | Performed by: INTERNAL MEDICINE

## 2024-10-21 PROCEDURE — 99214 OFFICE O/P EST MOD 30 MIN: CPT | Performed by: INTERNAL MEDICINE

## 2024-10-21 RX ORDER — METHOTREXATE 2.5 MG/1
15 TABLET ORAL WEEKLY
Qty: 78 TABLET | Refills: 1 | Status: SHIPPED | OUTPATIENT
Start: 2024-10-21

## 2024-10-21 RX ORDER — FOLIC ACID 1 MG/1
1 TABLET ORAL DAILY
Qty: 90 TABLET | Refills: 2 | Status: SHIPPED | OUTPATIENT
Start: 2024-10-21

## 2024-10-21 NOTE — PROGRESS NOTES
Rheumatology Clinic Visit      Nelda Palma MRN# [id number]   YOB: 1940 Age: 84 year old      Date of visit: 10/21/24   PCP: Dr. Dennise Dye at the [facility]  Endocrinologist: Dr. Aditya De Leon at [facility].     Chief Complaint   Patient presents with:  Rheumatoid Arthritis: Pain and stiffness comes and goes and moves around    Assessment and Plan     1.  Seropositive erosive rheumatoid arthritis (, CCP >2776.8): Reportedly diagnosed in 2004.  Reportedly on hydroxychloroquine in the past that was effective and slowly tapered off.  Note that the patient reports her ophthalmologist telling her that Plaquenil can be very toxic to her eyes and the patient is under the impression from her ophthalmologist that she is to avoid Plaquenil, but I do not see records documenting a contraindication for Plaquenil use so if it is needed again in the future then we will need ophthalmology clearance.  Currently on methotrexate 15 mg once weekly (transaminitis with higher doses).  Mild synovitis of the hands today; we discussed adding sulfasalazine.  Patient reports that she feels like she is doing well and does not want to change treatment plan at this time.    Chronic illness  - Continue methotrexate 15 mg once weekly  - Continue folic acid 1mg daily  - Labs in 3 months: CBC, Creatinine, Hepatic Panel  - Labs in 6 months: CBC, Creatinine, Hepatic Panel, ESR, CRP     High risk medication requiring intensive toxicity monitoring at least quarterly     2.  Nonradiating neck pain in the setting of RA: reportedly a chronic issue for years.  No instability or degenerative change seen on 2/14/2019 x-ray.  Significant improvement with physical therapy exercises and does well as long as she continues these exercises on her own at home.    3. Bilateral knee pain: Patellofemoral syndrome based on history, pes anserine bursitis bilaterally; history of left TKA.  Significant improvement with  physical therapy exercises and she has restarted these exercises and walking now.  Advise topical Voltaren gel as needed.   - diclofenac (VOLTAREN) 1 % topical gel; Apply up to 4 grams of 1% gel to lower extremity up to 4 times daily as needed for joint pain  Dispense: 400 g; Refill: 1    4.  Chronic low back pain: Patient reports that she saw a surgeon who said that there was no treatment that could be offered other than physical therapy so she is going to [facility] for physical therapy every other week at the direction of her spine surgeon    5.  Right greater trochanteric bursitis:  Worse when laying on the right side and starting to disrupt sleep.  Discussed option for steroid injection; declined at this time but the patient reports that she will consider injection in the future.   Chronic illness, progressive.      6. Bilateral hand osteoarthritis: Stable    7. Osteoporosis: Managed by her endocrinologist, Dr. Aditya De Leon at [facility].     8.  Vaccinations: Vaccinations reviewed with Ms. Palma.  - Influenza: Advised yearly vaccination, and to hold methotrexate for 1 week afterward  - Shingrix: Up to date   - COVID-19: Advised keeping updated, and to hold methotrexate for 1-2 weeks afterward  - Tetanus: Advised updating     Total minutes spent in evaluation with patient, documentation, , and review of pertinent studies and chart notes: 22  The longitudinal plan of care for the rheumatology problem(s) were addressed during this visit.  Due to added complexity of care, we will continue to support the patient and the subsequent management of this condition with ongoing continuity of care.        Ms. Palma verbalized agreement with and understanding of the rational for the diagnosis and treatment plan.  All questions were answered to best of my ability and the patient's satisfaction. Ms. Palma was advised to contact the clinic with any questions that may arise after the clinic visit.      Thank you for  involving me in the care of the patient    Return to clinic: 6 months      HPI   Nelda Palma is a 84 year old female with a past medical history significant for hypertension, left TKA (2007), migraines, rosacea, bilateral cataract surgery in 2009, hypothyroidism, senile osteoporosis, and rheumatoid arthritis who is seen for follow-up of RA.      7/13/2023: Return of neck pain since she has not been doing physical therapy exercises on her own at home.  She plans to restart physical therapy exercises for her neck.  She has been following with her primary care provider for chronic neck pain.  Rheumatoid arthritis well controlled.  States that methotrexate sometimes causes mild stomach upset so she was thinking about splitting the dose up to be 4 tablets in the morning and 4 tablets in the evening on the same day of each week.  Morning stiffness for no more than 5-10 minutes.  Sedentary lifestyle; sometimes walks for exercise.    10/23/2023: Bilateral knee pain since she has reduced her physical activity.  She says she is not walking much because she is lazy.  Knee pain improved with physical therapy in the past but she is not doing those exercises now.  No other joint pain.  Morning stiffness for no more than 5-10 minutes.  Taking methotrexate 15 mg once weekly.  No neck pain currently.    4/22/2024: Reports doing well.  Went to see a spine surgeon regarding chronic degenerative low back pain where she was told that no intervention could be done and therefore she should focus on physical therapy so she has been going to [facility] for physical therapy, with a plan to go to PT every other week.  Knees are doing well at this time; she says her knees do well as long as she exercises regularly.  Right greater trochanteric bursa pain that is worse when she lays on the right side and she can reproduce the pain by self-palpation over the greater trochanteric bursa.  RA has been stable.  Taking methotrexate 15 mg once  weekly and folic acid 1 mg daily.    Today, 10/21/2024: Reports that the rheumatoid arthritis is doing well.  Has bilateral knee pain that is worse with going up and down stairs, going up and down inclines, and walking longer distances, but she also reports that she has not been very physically active and is just now started walking more.  No knee swelling.  Recalls that her left knee was replaced in 2007.  Knees do do not lock or give out.  Greater trochanteric bursitis bothers her when she sleeps; states that she would like to monitor without intervention; declined steroid injection today.  No other joint pain.    Denies fevers, chills, nausea, vomiting, constipation, diarrhea. No abdominal pain. No chest pain/pressure, palpitations, or shortness of breath. No LE swelling. No oral or nasal sores.  No rash. No sicca symptoms.     Tobacco: none  EtOH: none  Drugs: none  Occupation: [employer], now retired    ROS   12 point review of system was completed and negative except as noted in the HPI     Active Problem List     Patient Active Problem List   Diagnosis    Actinic keratosis    Essential hypertension    GA (granuloma annulare)    Headache    Hypothyroidism    Meningioma (H)    Rheumatoid arthritis involving both hands with positive rheumatoid factor (H)    Rosacea    Senile osteoporosis    Sensorineural hearing loss, bilateral    Neck pain    Chronic pain of both knees    Hx of total knee arthroplasty, left    Impairment of balance     Past Medical History   See above    Past Surgical History   Left TKA    Allergy     Allergies   Allergen Reactions    Oxycodone-Acetaminophen Anxiety     After TKA she developed marked post operative anxiety felt to be due to the Percocet; took it again after going home and symptoms recurred.    Penicillins Rash     Current Medication List     Current Outpatient Medications   Medication Sig Dispense Refill    amLODIPine (NORVASC) 5 MG tablet Take 1 tablet by mouth  "daily      CALCIUM CITRATE-VITAMIN D PO       denosumab (PROLIA) 60 MG/ML SOSY injection Inject 60 mg Subcutaneous      diclofenac (VOLTAREN) 1 % topical gel Apply up to 4 times per day to the hands as needed; no more than 2 grams per application to the upper extremities. (Patient not taking: Reported on 2/9/2023) 200 g 1    folic acid (FOLVITE) 1 MG tablet Take 1 tablet (1 mg) by mouth daily 90 tablet 2    hydrochlorothiazide (HYDRODIURIL) 25 MG tablet Take 25 mg by mouth daily  3    levothyroxine (SYNTHROID/LEVOTHROID) 75 MCG tablet TAKE 1 TABLET (75 MCG) BY MOUTH BEFORE BREAKFAST.      losartan (COZAAR) 50 MG tablet Take 75 mg by mouth daily      magnesium 250 MG tablet Take 1 tablet by mouth daily      methotrexate 2.5 MG tablet Take 6 tablets (15 mg) by mouth once a week. . This is a once weekly medication, taken on the same day of each week. 78 tablet 0    Vitamin D, Cholecalciferol, 400 units TABS        No current facility-administered medications for this visit.     Social History   See HPI    Family History     Family History   Problem Relation Age of Onset    Diabetes Mother     Diabetes Father        Physical Exam     Temp Readings from Last 3 Encounters:   06/06/19 97.9  F (36.6  C) (Oral)   02/14/19 96.3  F (35.7  C) (Oral)     BP Readings from Last 5 Encounters:   10/23/23 138/74   07/13/23 (!) 143/81   02/09/23 (!) 156/77   10/14/22 (!) 156/89   04/07/22 (!) 143/84     Pulse Readings from Last 1 Encounters:   10/23/23 71     Resp Readings from Last 1 Encounters:   No data found for Resp     Estimated body mass index is 26.89 kg/m  as calculated from the following:    Height as of 10/7/21: 1.575 m (5' 2\").    Weight as of 10/23/23: 66.7 kg (147 lb).     GEN: NAD.   HEENT:  Anicteric, noninjected sclera. No obvious external lesions of the ear and nose. Hearing intact.  PULM: No increased work of breathing.   MSK: Synovial swelling and tenderness to palpation of the right second MCP and the right " 3rd-4th PIPs.  Other MCPs and PIPs without swelling or tenderness to palpation.  DIPs nontender to palpation.  Heberden's nodes present.  Wrists, elbows, and shoulders without swelling or tenderness to palpation.  Right hip tender to palpation over the greater trochanteric bursa.  Left hip nontender to palpation.  Knees tender to palpation at the medial joint lines and over the pes anserine bursae.  Ankles and MTPs without swelling or tenderness to palpation.    SKIN: No rash or jaundice seen  PSYCH: Alert. Appropriate.      Labs / Imaging (select studies)     CBC  Recent Labs   Lab Test 10/14/24  1018 07/15/24  1016 04/15/24  1018 10/04/21  0913 07/08/21  0914 04/07/21  1316 09/25/20  0921   WBC 6.0 5.1 4.7   < > 5.1 7.5 6.6   RBC 4.25 4.06 4.02   < > 4.19 4.18 4.49   HGB 12.5 11.8 12.0   < > 12.1 12.2 13.0   HCT 38.9 36.9 37.1   < > 35.9 36.5 39.1   MCV 92 91 92   < > 86 87 87   RDW 14.8 14.9 15.2*   < > 15.1* 14.9 15.5*    200 238   < > 247 303 268   MCH 29.4 29.1 29.9   < > 28.9 29.2 29.0   MCHC 32.1 32.0 32.3   < > 33.7 33.4 33.2   NEUTROPHIL 65 55 59   < > 63.7 68.2 68.2   LYMPH 24 32 28   < > 23.7 20.6 22.3   MONOCYTE 9 10 10   < > 11.2 9.7 8.1   EOSINOPHIL 2 2 3   < > 1.2 1.2 1.1   BASOPHIL 0 1 1   < > 0.2 0.3 0.3   ANEU  --   --   --   --  3.3 5.2 4.5   ALYM  --   --   --   --  1.2 1.6 1.5   NGOC  --   --   --   --  0.6 0.7 0.5   AEOS  --   --   --   --  0.1 0.1 0.1   ABAS  --   --   --   --  0.0 0.0 0.0   ANEUTAUTO 3.9 2.8 2.8   < >  --   --   --    ALYMPAUTO 1.5 1.6 1.3   < >  --   --   --    AMONOAUTO 0.6 0.5 0.5   < >  --   --   --    AEOSAUTO 0.1 0.1 0.1   < >  --   --   --    ABSBASO 0.0 0.0 0.0   < >  --   --   --     < > = values in this interval not displayed.     CMP  Recent Labs   Lab Test 10/14/24  1018 07/15/24  1016 04/15/24  1018 10/04/21  0913 07/08/21  0914 04/07/21  1316 09/25/20  0921   CR 0.82 0.91 0.77   < > 0.81 0.84 0.86   GFRESTIMATED 69 62 75   < > 67 65 63   GFRESTBLACK  --    --   --   --  78 75 73   BILITOTAL 0.4 0.3 0.4   < > 0.5 0.4 0.5   ALBUMIN 4.4 4.1 3.9   < > 3.6 3.6 3.8   PROTTOTAL 7.5 7.1 7.0   < > 7.1 7.2 7.6   ALKPHOS 35* 31* 35*   < > 39* 38* 36*   AST 26 28 31   < > 22 22 22   ALT 18 17 27   < > 23 26 22    < > = values in this interval not displayed.     ESR/CRP  Recent Labs   Lab Test 10/14/24  1018 04/15/24  1018 10/19/23  1017 10/10/22  1018 04/05/22  1019 01/19/22  1035 10/04/21  0913   SED 26 29 25   < > 33* 29 51*   CRP  --   --   --   --  <2.9 <2.9 5.4   CRPI <3.00 <3.00 <3.00   < >  --   --   --     < > = values in this interval not displayed.     Hepatitis B  Recent Labs   Lab Test 09/25/20  0921 10/10/19  1118   HBCAB Nonreactive Nonreactive   HEPBANG Nonreactive  --      Hepatitis C  Recent Labs   Lab Test 07/07/22  1020   HCVAB Nonreactive     Immunization History     Immunization History   Administered Date(s) Administered    COVID-19 12+ (MODERNA) 10/14/2024    COVID-19 12+ (Pfizer) 11/09/2023    COVID-19 Bivalent 12+ (Pfizer) 11/18/2022, 05/08/2023    COVID-19 Monovalent 12+ (Pfizer 2022) 05/05/2022    COVID-19 Monovalent 18+ (Moderna) 02/25/2021, 03/25/2021, 11/12/2021    Influenza (High Dose) Trivalent,PF (Fluzone) 10/14/2024    Influenza Vaccine 65+ (Fluzone HD) 10/14/2022, 10/23/2023          Chart documentation done in part with Dragon Voice recognition Software. Although reviewed after completion, some word and grammatical error may remain.    Phill Villa MD

## 2024-10-21 NOTE — PATIENT INSTRUCTIONS
RHEUMATOLOGY    [facility]  [address] [place] [address]  [place] [address]    Phone number: [phone]  Fax number: [phone]    If you need a medication refill, please contact us as you may need lab work and/or a follow up visit prior to your refill.      Thank you for choosing [facility]!    Abi Sauceda CMA Rheumatology

## 2025-01-20 ENCOUNTER — LAB (OUTPATIENT)
Dept: LAB | Facility: CLINIC | Age: 85
End: 2025-01-20
Payer: COMMERCIAL

## 2025-01-20 DIAGNOSIS — Z79.899 HIGH RISK MEDICATION USE: ICD-10-CM

## 2025-01-20 DIAGNOSIS — M05.79 RHEUMATOID ARTHRITIS INVOLVING MULTIPLE SITES WITH POSITIVE RHEUMATOID FACTOR (H): ICD-10-CM

## 2025-01-20 LAB
ALBUMIN SERPL BCG-MCNC: 4 G/DL (ref 3.5–5.2)
ALP SERPL-CCNC: 32 U/L (ref 40–150)
ALT SERPL W P-5'-P-CCNC: 22 U/L (ref 0–50)
AST SERPL W P-5'-P-CCNC: 29 U/L (ref 0–45)
BASOPHILS # BLD AUTO: 0 10E3/UL (ref 0–0.2)
BASOPHILS NFR BLD AUTO: 0 %
BILIRUB DIRECT SERPL-MCNC: <0.2 MG/DL (ref 0–0.3)
BILIRUB SERPL-MCNC: 0.4 MG/DL
CREAT SERPL-MCNC: 0.8 MG/DL (ref 0.51–0.95)
EGFRCR SERPLBLD CKD-EPI 2021: 71 ML/MIN/1.73M2
EOSINOPHIL # BLD AUTO: 0.1 10E3/UL (ref 0–0.7)
EOSINOPHIL NFR BLD AUTO: 2 %
ERYTHROCYTE [DISTWIDTH] IN BLOOD BY AUTOMATED COUNT: 14.7 % (ref 10–15)
HCT VFR BLD AUTO: 37.1 % (ref 35–47)
HGB BLD-MCNC: 12.3 G/DL (ref 11.7–15.7)
IMM GRANULOCYTES # BLD: 0 10E3/UL
IMM GRANULOCYTES NFR BLD: 0 %
LYMPHOCYTES # BLD AUTO: 1.6 10E3/UL (ref 0.8–5.3)
LYMPHOCYTES NFR BLD AUTO: 30 %
MCH RBC QN AUTO: 29.6 PG (ref 26.5–33)
MCHC RBC AUTO-ENTMCNC: 33.2 G/DL (ref 31.5–36.5)
MCV RBC AUTO: 89 FL (ref 78–100)
MONOCYTES # BLD AUTO: 0.5 10E3/UL (ref 0–1.3)
MONOCYTES NFR BLD AUTO: 10 %
NEUTROPHILS # BLD AUTO: 3.1 10E3/UL (ref 1.6–8.3)
NEUTROPHILS NFR BLD AUTO: 58 %
PLATELET # BLD AUTO: 205 10E3/UL (ref 150–450)
PROT SERPL-MCNC: 7 G/DL (ref 6.4–8.3)
RBC # BLD AUTO: 4.16 10E6/UL (ref 3.8–5.2)
WBC # BLD AUTO: 5.4 10E3/UL (ref 4–11)

## 2025-01-20 PROCEDURE — 36415 COLL VENOUS BLD VENIPUNCTURE: CPT

## 2025-01-20 PROCEDURE — 80076 HEPATIC FUNCTION PANEL: CPT

## 2025-01-20 PROCEDURE — 82565 ASSAY OF CREATININE: CPT

## 2025-01-20 PROCEDURE — 85025 COMPLETE CBC W/AUTO DIFF WBC: CPT

## 2025-03-22 ENCOUNTER — HEALTH MAINTENANCE LETTER (OUTPATIENT)
Age: 85
End: 2025-03-22

## 2025-04-12 ENCOUNTER — HEALTH MAINTENANCE LETTER (OUTPATIENT)
Age: 85
End: 2025-04-12

## 2025-04-21 ENCOUNTER — OFFICE VISIT (OUTPATIENT)
Dept: RHEUMATOLOGY | Facility: CLINIC | Age: 85
End: 2025-04-21
Payer: COMMERCIAL

## 2025-04-21 VITALS
SYSTOLIC BLOOD PRESSURE: 145 MMHG | HEART RATE: 89 BPM | WEIGHT: 140.2 LBS | BODY MASS INDEX: 25.64 KG/M2 | OXYGEN SATURATION: 98 % | DIASTOLIC BLOOD PRESSURE: 80 MMHG

## 2025-04-21 DIAGNOSIS — M05.79 RHEUMATOID ARTHRITIS INVOLVING MULTIPLE SITES WITH POSITIVE RHEUMATOID FACTOR (H): Primary | ICD-10-CM

## 2025-04-21 DIAGNOSIS — Z79.899 HIGH RISK MEDICATION USE: ICD-10-CM

## 2025-04-21 LAB
BASOPHILS # BLD AUTO: 0 10E3/UL (ref 0–0.2)
BASOPHILS NFR BLD AUTO: 1 %
EOSINOPHIL # BLD AUTO: 0.1 10E3/UL (ref 0–0.7)
EOSINOPHIL NFR BLD AUTO: 2 %
ERYTHROCYTE [DISTWIDTH] IN BLOOD BY AUTOMATED COUNT: 15 % (ref 10–15)
HCT VFR BLD AUTO: 39.7 % (ref 35–47)
HGB BLD-MCNC: 12.6 G/DL (ref 11.7–15.7)
IMM GRANULOCYTES # BLD: 0 10E3/UL
IMM GRANULOCYTES NFR BLD: 0 %
LYMPHOCYTES # BLD AUTO: 1.2 10E3/UL (ref 0.8–5.3)
LYMPHOCYTES NFR BLD AUTO: 23 %
MCH RBC QN AUTO: 29 PG (ref 26.5–33)
MCHC RBC AUTO-ENTMCNC: 31.7 G/DL (ref 31.5–36.5)
MCV RBC AUTO: 92 FL (ref 78–100)
MONOCYTES # BLD AUTO: 0.5 10E3/UL (ref 0–1.3)
MONOCYTES NFR BLD AUTO: 10 %
NEUTROPHILS # BLD AUTO: 3.4 10E3/UL (ref 1.6–8.3)
NEUTROPHILS NFR BLD AUTO: 65 %
PLATELET # BLD AUTO: 223 10E3/UL (ref 150–450)
RBC # BLD AUTO: 4.34 10E6/UL (ref 3.8–5.2)
WBC # BLD AUTO: 5.3 10E3/UL (ref 4–11)

## 2025-04-21 PROCEDURE — 3079F DIAST BP 80-89 MM HG: CPT | Performed by: INTERNAL MEDICINE

## 2025-04-21 PROCEDURE — 99214 OFFICE O/P EST MOD 30 MIN: CPT | Performed by: INTERNAL MEDICINE

## 2025-04-21 PROCEDURE — G2211 COMPLEX E/M VISIT ADD ON: HCPCS | Performed by: INTERNAL MEDICINE

## 2025-04-21 PROCEDURE — 36415 COLL VENOUS BLD VENIPUNCTURE: CPT | Performed by: INTERNAL MEDICINE

## 2025-04-21 PROCEDURE — 85025 COMPLETE CBC W/AUTO DIFF WBC: CPT | Performed by: INTERNAL MEDICINE

## 2025-04-21 PROCEDURE — 3077F SYST BP >= 140 MM HG: CPT | Performed by: INTERNAL MEDICINE

## 2025-04-21 PROCEDURE — 82565 ASSAY OF CREATININE: CPT | Performed by: INTERNAL MEDICINE

## 2025-04-21 PROCEDURE — 80076 HEPATIC FUNCTION PANEL: CPT | Performed by: INTERNAL MEDICINE

## 2025-04-21 RX ORDER — SULFASALAZINE 500 MG/1
500 TABLET, DELAYED RELEASE ORAL 2 TIMES DAILY
Qty: 60 TABLET | Refills: 3 | Status: SHIPPED | OUTPATIENT
Start: 2025-04-21

## 2025-04-21 RX ORDER — METHOTREXATE 2.5 MG/1
15 TABLET ORAL WEEKLY
Qty: 78 TABLET | Refills: 1 | Status: SHIPPED | OUTPATIENT
Start: 2025-04-21

## 2025-04-21 NOTE — PROGRESS NOTES
Rheumatology Clinic Visit      Nelda Palma MRN# [id number]   YOB: 1940 Age: 84 year old      Date of visit: 4/21/25   PCP: Dr. Dennise Dye at the [facility]  Endocrinologist: Dr. Aditya De Leon at [facility].     Chief Complaint   Patient presents with:  Rheumatoid Arthritis    Assessment and Plan     1.  Seropositive erosive rheumatoid arthritis (, CCP >2776.8): Reportedly diagnosed in 2004.  Reportedly on hydroxychloroquine in the past that was effective and slowly tapered off.  Note that the patient reports her ophthalmologist telling her that Plaquenil can be very toxic to her eyes and the patient is under the impression from her ophthalmologist that she is to avoid Plaquenil, but I do not see records documenting a contraindication for Plaquenil use so if it is needed again in the future then we will need ophthalmology clearance.  Currently on methotrexate 15 mg once weekly (transaminitis with higher doses).  Mild synovitis of the hands when seen in October 2024 but the patient preferred to remain on her current regiment at that time.  Today, 4/21/2025: Still with mild synovitis seen on exam today and we discussed escalating treatment with sulfasalazine again and she is now in agreement.  Chronic illness  - Continue methotrexate 15 mg once weekly  - Continue folic acid 1mg daily  - Start sulfasalazine 500 mg twice daily  - Labs today: CBC, Creatinine, Hepatic Panel  - Labs monthly [id number]: CBC, Cr, Hepatic Panel  - Labs in 3 months: CBC, Creatinine, Hepatic Panel, ESR, CRP    High risk medication requiring intensive toxicity monitoring at least quarterly    # Sulfasalazine Risks and Benefits: The risks and benefits of sulfasalazine were discussed in detail and the patient verbalized understanding.  The risks discussed include, but are not limited to, the risk for hypersensitivity, anaphylaxis, anaphylactoid reactions, infections, bone marrow suppression,   hepatotoxicity, nausea, vomiting, and GI upset.   I encouraged reviewing the package insert and asking any questions about the medication.       2.  History of nonradiating neck pain in the setting of RA: reportedly a chronic issue for years.  No instability or degenerative change seen on 2/14/2019 x-ray.  Significant improvement with physical therapy exercises and does well as long as she continues these exercises on her own at home.    3. Bilateral knee pain: Patellofemoral syndrome based on history, pes anserine bursitis bilaterally; history of left TKA.  Significant improvement with physical therapy exercises and she has restarted these exercises and walking now.  Advise topical Voltaren gel as needed.   - diclofenac (VOLTAREN) 1 % topical gel; Apply up to 4 grams of 1% gel to lower extremity up to 4 times daily as needed for joint pain  Dispense: 400 g; Refill: 1    4.  Chronic low back pain: Patient reports that she saw a surgeon who said that there was no treatment that could be offered other than physical therapy so she is going to [facility] for physical therapy every other week at the direction of her spine surgeon    5.  History of right greater trochanteric bursitis:  Worse when laying on the right side and starting to disrupt sleep.  Discussed option for steroid injection; declined at this time but the patient reports that she will consider injection in the future.   Chronic illness, progressive.      6. Bilateral hand osteoarthritis: Stable    7. Osteoporosis: Managed by her endocrinologist, Dr. Aditya De Leon at [facility].     8.  Vaccinations: Vaccinations reviewed with Ms. Palma.  - Influenza: Advised yearly vaccination, and to hold methotrexate for 1 week afterward  - Shingrix: Up to date   - COVID-19: Advised keeping updated, and to hold methotrexate for 1-2 weeks afterward  - Tetanus: Advised updating    9. Elevated blood pressure:  Nelda to follow up with Primary Care provider regarding elevated  blood pressure.      Total minutes spent in evaluation with patient, documentation, , and review of pertinent studies and chart notes: 20  The longitudinal plan of care for the rheumatology problem(s) were addressed during this visit.  Due to added complexity of care, we will continue to support the patient and the subsequent management of this condition with ongoing continuity of care.      Ms. Palma verbalized agreement with and understanding of the rational for the diagnosis and treatment plan.  All questions were answered to best of my ability and the patient's satisfaction. Ms. Palma was advised to contact the clinic with any questions that may arise after the clinic visit.      Thank you for involving me in the care of the patient    Return to clinic: 3 months    HPI   Nelda Palma is a 84 year old female with a past medical history significant for hypertension, left TKA (2007), migraines, rosacea, bilateral cataract surgery in 2009, hypothyroidism, senile osteoporosis, and rheumatoid arthritis who is seen for follow-up of RA.      7/13/2023: Return of neck pain since she has not been doing physical therapy exercises on her own at home.  She plans to restart physical therapy exercises for her neck.  She has been following with her primary care provider for chronic neck pain.  Rheumatoid arthritis well controlled.  States that methotrexate sometimes causes mild stomach upset so she was thinking about splitting the dose up to be 4 tablets in the morning and 4 tablets in the evening on the same day of each week.  Morning stiffness for no more than 5-10 minutes.  Sedentary lifestyle; sometimes walks for exercise.    10/23/2023: Bilateral knee pain since she has reduced her physical activity.  She says she is not walking much because she is lazy.  Knee pain improved with physical therapy in the past but she is not doing those exercises now.  No other joint pain.  Morning stiffness for no more than  5-10 minutes.  Taking methotrexate 15 mg once weekly.  No neck pain currently.    4/22/2024: Reports doing well.  Went to see a spine surgeon regarding chronic degenerative low back pain where she was told that no intervention could be done and therefore she should focus on physical therapy so she has been going to [facility] for physical therapy, with a plan to go to PT every other week.  Knees are doing well at this time; she says her knees do well as long as she exercises regularly.  Right greater trochanteric bursa pain that is worse when she lays on the right side and she can reproduce the pain by self-palpation over the greater trochanteric bursa.  RA has been stable.  Taking methotrexate 15 mg once weekly and folic acid 1 mg daily.    10/21/2024: Reports that the rheumatoid arthritis is doing well.  Has bilateral knee pain that is worse with going up and down stairs, going up and down inclines, and walking longer distances, but she also reports that she has not been very physically active and is just now started walking more.  No knee swelling.  Recalls that her left knee was replaced in 2007.  Knees do do not lock or give out.  Greater trochanteric bursitis bothers her when she sleeps; states that she would like to monitor without intervention; declined steroid injection today.  No other joint pain.    Today, 4/21/2025: Pain and swelling of the PIPs that is worse in the morning and improves with time activity, but too much activity worsens pain at these joints.  Other joints are doing well.  No pain at the MCPs or DIPs.    Denies fevers, chills, nausea, vomiting, constipation, diarrhea. No abdominal pain. No chest pain/pressure, palpitations, or shortness of breath. No LE swelling. No oral or nasal sores.  No rash. No sicca symptoms.     Tobacco: none  EtOH: none  Drugs: none  Occupation: [employer], now retired    ROS   12 point review of system was completed and negative except as noted  in the HPI     Active Problem List     Patient Active Problem List   Diagnosis    Actinic keratosis    Essential hypertension    GA (granuloma annulare)    Headache    Hypothyroidism    Meningioma (H)    Rheumatoid arthritis involving both hands with positive rheumatoid factor (H)    Rosacea    Senile osteoporosis    Sensorineural hearing loss, bilateral    Neck pain    Chronic pain of both knees    Hx of total knee arthroplasty, left    Impairment of balance     Past Medical History   See above    Past Surgical History   Left TKA    Allergy     Allergies   Allergen Reactions    Oxycodone-Acetaminophen Anxiety     After TKA she developed marked post operative anxiety felt to be due to the Percocet; took it again after going home and symptoms recurred.    Penicillins Rash     Current Medication List     Current Outpatient Medications   Medication Sig Dispense Refill    amLODIPine (NORVASC) 5 MG tablet Take 1 tablet by mouth daily      CALCIUM CITRATE-VITAMIN D PO       diclofenac (VOLTAREN) 1 % topical gel Apply up to 4 grams of 1% gel to lower extremity up to 4 times daily as needed for joint pain 400 g 1    folic acid (FOLVITE) 1 MG tablet Take 1 tablet (1 mg) by mouth daily. 90 tablet 2    levothyroxine (SYNTHROID/LEVOTHROID) 75 MCG tablet TAKE 1 TABLET (75 MCG) BY MOUTH BEFORE BREAKFAST.      losartan (COZAAR) 50 MG tablet Take 75 mg by mouth daily      magnesium 250 MG tablet Take 1 tablet by mouth daily      methotrexate 2.5 MG tablet Take 6 tablets (15 mg) by mouth once a week. . This is a once weekly medication, taken on the same day of each week. 78 tablet 1    denosumab (PROLIA) 60 MG/ML SOSY injection Inject 60 mg Subcutaneous      diclofenac (VOLTAREN) 1 % topical gel Apply up to 4 times per day to the hands as needed; no more than 2 grams per application to the upper extremities. (Patient not taking: Reported on 2/9/2023) 200 g 1    hydrochlorothiazide (HYDRODIURIL) 25 MG tablet Take 25 mg by mouth daily  " 3    Vitamin D, Cholecalciferol, 400 units TABS        No current facility-administered medications for this visit.     Social History   See HPI    Family History     Family History   Problem Relation Age of Onset    Diabetes Mother     Diabetes Father        Physical Exam     Temp Readings from Last 3 Encounters:   06/06/19 97.9  F (36.6  C) (Oral)   02/14/19 96.3  F (35.7  C) (Oral)     BP Readings from Last 5 Encounters:   10/21/24 136/78   10/23/23 138/74   07/13/23 (!) 143/81   02/09/23 (!) 156/77   10/14/22 (!) 156/89     Pulse Readings from Last 1 Encounters:   10/21/24 70     Resp Readings from Last 1 Encounters:   No data found for Resp     Estimated body mass index is 25.97 kg/m  as calculated from the following:    Height as of 10/7/21: 1.575 m (5' 2\").    Weight as of 10/21/24: 64.4 kg (142 lb).     GEN: NAD.   HEENT:  Anicteric, noninjected sclera. No obvious external lesions of the ear and nose. Hearing intact.  PULM: No increased work of breathing.   MSK: Synovial swelling and tenderness to palpation of the right 2nd-4th PIPs.  Mild tenderness without swelling of the left second PIP.  Other MCPs and PIPs without swelling or tenderness to palpation.  DIPs nontender to palpation.  Heberden's and Lynn's nodes present.  Wrists, elbows, and shoulders without swelling or tenderness to palpation.  Hips nontender to palpation over the trochanteric bursae.  Knees with mild medial joint line tenderness and crepitation but no effusion, increased warmth, or overlying erythema.    Ankles and MTPs without swelling or tenderness to palpation.    SKIN: No rash or jaundice seen  PSYCH: Alert. Appropriate.      Labs / Imaging (select studies)       CBC  Recent Labs   Lab Test 01/20/25  1022 10/14/24  1018 07/15/24  1016 10/04/21  0913 07/08/21  0914 04/07/21  1316 09/25/20  0921   WBC 5.4 6.0 5.1   < > 5.1 7.5 6.6   RBC 4.16 4.25 4.06   < > 4.19 4.18 4.49   HGB 12.3 12.5 11.8   < > 12.1 12.2 13.0   HCT 37.1 38.9 " 36.9   < > 35.9 36.5 39.1   MCV 89 92 91   < > 86 87 87   RDW 14.7 14.8 14.9   < > 15.1* 14.9 15.5*    222 200   < > 247 303 268   MCH 29.6 29.4 29.1   < > 28.9 29.2 29.0   MCHC 33.2 32.1 32.0   < > 33.7 33.4 33.2   NEUTROPHIL 58 65 55   < > 63.7 68.2 68.2   LYMPH 30 24 32   < > 23.7 20.6 22.3   MONOCYTE 10 9 10   < > 11.2 9.7 8.1   EOSINOPHIL 2 2 2   < > 1.2 1.2 1.1   BASOPHIL 0 0 1   < > 0.2 0.3 0.3   ANEU  --   --   --   --  3.3 5.2 4.5   ALYM  --   --   --   --  1.2 1.6 1.5   NGOC  --   --   --   --  0.6 0.7 0.5   AEOS  --   --   --   --  0.1 0.1 0.1   ABAS  --   --   --   --  0.0 0.0 0.0   ANEUTAUTO 3.1 3.9 2.8   < >  --   --   --    ALYMPAUTO 1.6 1.5 1.6   < >  --   --   --    AMONOAUTO 0.5 0.6 0.5   < >  --   --   --    AEOSAUTO 0.1 0.1 0.1   < >  --   --   --    ABSBASO 0.0 0.0 0.0   < >  --   --   --     < > = values in this interval not displayed.     CMP  Recent Labs   Lab Test 01/20/25  1022 10/14/24  1018 07/15/24  1016 10/04/21  0913 07/08/21  0914 04/07/21  1316 09/25/20  0921   CR 0.80 0.82 0.91   < > 0.81 0.84 0.86   GFRESTIMATED 71 69 62   < > 67 65 63   GFRESTBLACK  --   --   --   --  78 75 73   BILITOTAL 0.4 0.4 0.3   < > 0.5 0.4 0.5   ALBUMIN 4.0 4.4 4.1   < > 3.6 3.6 3.8   PROTTOTAL 7.0 7.5 7.1   < > 7.1 7.2 7.6   ALKPHOS 32* 35* 31*   < > 39* 38* 36*   AST 29 26 28   < > 22 22 22   ALT 22 18 17   < > 23 26 22    < > = values in this interval not displayed.     ESR/CRP  Recent Labs   Lab Test 10/14/24  1018 04/15/24  1018 10/19/23  1017 10/10/22  1018 04/05/22  1019 01/19/22  1035 10/04/21  0913   SED 26 29 25   < > 33* 29 51*   CRP  --   --   --   --  <2.9 <2.9 5.4   CRPI <3.00 <3.00 <3.00   < >  --   --   --     < > = values in this interval not displayed.     Hepatitis B  Recent Labs   Lab Test 09/25/20  0921 10/10/19  1118   HBCAB Nonreactive Nonreactive   HEPBANG Nonreactive  --      Hepatitis C  Recent Labs   Lab Test 07/07/22  1020   HCVAB Nonreactive       Immunization History      Immunization History   Administered Date(s) Administered    COVID-19 12+ (MODERNA) 10/14/2024    COVID-19 12+ (Pfizer) 11/09/2023    COVID-19 Bivalent 12+ (Pfizer) 11/18/2022, 05/08/2023    COVID-19 Monovalent 12+ (Pfizer 2022) 05/05/2022    COVID-19 Monovalent 18+ (Moderna) 02/25/2021, 03/25/2021, 11/12/2021    Influenza (High Dose) Trivalent,PF (Fluzone) 10/14/2024    Influenza Vaccine 65+ (Fluzone HD) 10/14/2022, 10/23/2023          Chart documentation done in part with Dragon Voice recognition Software. Although reviewed after completion, some word and grammatical error may remain.    Phill Villa MD

## 2025-04-21 NOTE — PATIENT INSTRUCTIONS
RHEUMATOLOGY    [facility]  [address] [place] [address]  [place] [address]    Phone number: [phone]  Fax number: [phone]    If you need a medication refill, please contact us as you may need lab work and/or a follow up visit prior to your refill.      Thank you for choosing [facility]!    Abi Sauceda CMA Rheumatology

## 2025-04-22 LAB
ALBUMIN SERPL BCG-MCNC: 4.2 G/DL (ref 3.5–5.2)
ALP SERPL-CCNC: 33 U/L (ref 40–150)
ALT SERPL W P-5'-P-CCNC: 43 U/L (ref 0–50)
AST SERPL W P-5'-P-CCNC: 40 U/L (ref 0–45)
BILIRUB DIRECT SERPL-MCNC: 0.1 MG/DL (ref 0–0.3)
BILIRUB SERPL-MCNC: 0.3 MG/DL
CREAT SERPL-MCNC: 0.78 MG/DL (ref 0.51–0.95)
EGFRCR SERPLBLD CKD-EPI 2021: 74 ML/MIN/1.73M2
PROT SERPL-MCNC: 7.4 G/DL (ref 6.4–8.3)

## 2025-05-19 ENCOUNTER — LAB (OUTPATIENT)
Dept: LAB | Facility: CLINIC | Age: 85
End: 2025-05-19
Payer: COMMERCIAL

## 2025-05-19 DIAGNOSIS — Z79.899 HIGH RISK MEDICATION USE: ICD-10-CM

## 2025-05-19 DIAGNOSIS — M05.79 RHEUMATOID ARTHRITIS INVOLVING MULTIPLE SITES WITH POSITIVE RHEUMATOID FACTOR (H): ICD-10-CM

## 2025-05-19 LAB
ALBUMIN SERPL BCG-MCNC: 4 G/DL (ref 3.5–5.2)
ALP SERPL-CCNC: 34 U/L (ref 40–150)
ALT SERPL W P-5'-P-CCNC: 27 U/L (ref 0–50)
AST SERPL W P-5'-P-CCNC: 32 U/L (ref 0–45)
BASOPHILS # BLD AUTO: 0 10E3/UL (ref 0–0.2)
BASOPHILS NFR BLD AUTO: 1 %
BILIRUB SERPL-MCNC: 0.4 MG/DL
BILIRUBIN DIRECT (ROCHE PRO & PURE): 0.15 MG/DL (ref 0–0.45)
CREAT SERPL-MCNC: 0.78 MG/DL (ref 0.51–0.95)
EGFRCR SERPLBLD CKD-EPI 2021: 74 ML/MIN/1.73M2
EOSINOPHIL # BLD AUTO: 0.1 10E3/UL (ref 0–0.7)
EOSINOPHIL NFR BLD AUTO: 2 %
ERYTHROCYTE [DISTWIDTH] IN BLOOD BY AUTOMATED COUNT: 14.9 % (ref 10–15)
HCT VFR BLD AUTO: 38.4 % (ref 35–47)
HGB BLD-MCNC: 12.7 G/DL (ref 11.7–15.7)
IMM GRANULOCYTES # BLD: 0 10E3/UL
IMM GRANULOCYTES NFR BLD: 0 %
LYMPHOCYTES # BLD AUTO: 1 10E3/UL (ref 0.8–5.3)
LYMPHOCYTES NFR BLD AUTO: 24 %
MCH RBC QN AUTO: 29.3 PG (ref 26.5–33)
MCHC RBC AUTO-ENTMCNC: 33.1 G/DL (ref 31.5–36.5)
MCV RBC AUTO: 89 FL (ref 78–100)
MONOCYTES # BLD AUTO: 0.4 10E3/UL (ref 0–1.3)
MONOCYTES NFR BLD AUTO: 9 %
NEUTROPHILS # BLD AUTO: 2.7 10E3/UL (ref 1.6–8.3)
NEUTROPHILS NFR BLD AUTO: 64 %
PLATELET # BLD AUTO: 191 10E3/UL (ref 150–450)
PROT SERPL-MCNC: 7.1 G/DL (ref 6.4–8.3)
RBC # BLD AUTO: 4.33 10E6/UL (ref 3.8–5.2)
WBC # BLD AUTO: 4.1 10E3/UL (ref 4–11)

## 2025-05-19 PROCEDURE — 80076 HEPATIC FUNCTION PANEL: CPT

## 2025-05-19 PROCEDURE — 85025 COMPLETE CBC W/AUTO DIFF WBC: CPT

## 2025-05-19 PROCEDURE — 82565 ASSAY OF CREATININE: CPT

## 2025-05-19 PROCEDURE — 36415 COLL VENOUS BLD VENIPUNCTURE: CPT

## 2025-05-21 ENCOUNTER — TELEPHONE (OUTPATIENT)
Dept: RHEUMATOLOGY | Facility: CLINIC | Age: 85
End: 2025-05-21
Payer: COMMERCIAL

## 2025-05-21 NOTE — TELEPHONE ENCOUNTER
BRIGITTE, appointment on 8/4/2025 needs to be rescheduled as Dr. Villa will be out of clinic. May use [id number] spot. If patient asks they may do video visit.    Abi Sauceda CMA Rheumatology  5/21/2025

## 2025-06-02 ENCOUNTER — RESULTS FOLLOW-UP (OUTPATIENT)
Dept: RHEUMATOLOGY | Facility: CLINIC | Age: 85
End: 2025-06-02

## 2025-06-16 ENCOUNTER — LAB (OUTPATIENT)
Dept: LAB | Facility: CLINIC | Age: 85
End: 2025-06-16
Payer: COMMERCIAL

## 2025-06-16 DIAGNOSIS — M05.79 RHEUMATOID ARTHRITIS INVOLVING MULTIPLE SITES WITH POSITIVE RHEUMATOID FACTOR (H): ICD-10-CM

## 2025-06-16 DIAGNOSIS — Z79.899 HIGH RISK MEDICATION USE: ICD-10-CM

## 2025-06-16 LAB
ALBUMIN SERPL BCG-MCNC: 4.1 G/DL (ref 3.5–5.2)
ALP SERPL-CCNC: 34 U/L (ref 40–150)
ALT SERPL W P-5'-P-CCNC: 30 U/L (ref 0–50)
AST SERPL W P-5'-P-CCNC: 41 U/L (ref 0–45)
BASOPHILS # BLD AUTO: 0 10E3/UL (ref 0–0.2)
BASOPHILS NFR BLD AUTO: 0 %
BILIRUB SERPL-MCNC: 0.4 MG/DL
BILIRUBIN DIRECT (ROCHE PRO & PURE): 0.19 MG/DL (ref 0–0.45)
CREAT SERPL-MCNC: 0.82 MG/DL (ref 0.51–0.95)
EGFRCR SERPLBLD CKD-EPI 2021: 69 ML/MIN/1.73M2
EOSINOPHIL # BLD AUTO: 0.1 10E3/UL (ref 0–0.7)
EOSINOPHIL NFR BLD AUTO: 2 %
ERYTHROCYTE [DISTWIDTH] IN BLOOD BY AUTOMATED COUNT: 15.8 % (ref 10–15)
HCT VFR BLD AUTO: 37.6 % (ref 35–47)
HGB BLD-MCNC: 12.4 G/DL (ref 11.7–15.7)
IMM GRANULOCYTES # BLD: 0 10E3/UL
IMM GRANULOCYTES NFR BLD: 0 %
LYMPHOCYTES # BLD AUTO: 1.4 10E3/UL (ref 0.8–5.3)
LYMPHOCYTES NFR BLD AUTO: 27 %
MCH RBC QN AUTO: 29.2 PG (ref 26.5–33)
MCHC RBC AUTO-ENTMCNC: 33 G/DL (ref 31.5–36.5)
MCV RBC AUTO: 89 FL (ref 78–100)
MONOCYTES # BLD AUTO: 0.5 10E3/UL (ref 0–1.3)
MONOCYTES NFR BLD AUTO: 9 %
NEUTROPHILS # BLD AUTO: 3.2 10E3/UL (ref 1.6–8.3)
NEUTROPHILS NFR BLD AUTO: 62 %
PLATELET # BLD AUTO: 272 10E3/UL (ref 150–450)
PROT SERPL-MCNC: 7.2 G/DL (ref 6.4–8.3)
RBC # BLD AUTO: 4.25 10E6/UL (ref 3.8–5.2)
WBC # BLD AUTO: 5.2 10E3/UL (ref 4–11)

## 2025-06-16 PROCEDURE — 36415 COLL VENOUS BLD VENIPUNCTURE: CPT

## 2025-06-16 PROCEDURE — 82565 ASSAY OF CREATININE: CPT

## 2025-06-16 PROCEDURE — 80076 HEPATIC FUNCTION PANEL: CPT

## 2025-06-16 PROCEDURE — 85025 COMPLETE CBC W/AUTO DIFF WBC: CPT

## 2025-07-14 ENCOUNTER — LAB (OUTPATIENT)
Dept: LAB | Facility: CLINIC | Age: 85
End: 2025-07-14
Payer: COMMERCIAL

## 2025-07-14 DIAGNOSIS — M05.79 RHEUMATOID ARTHRITIS INVOLVING MULTIPLE SITES WITH POSITIVE RHEUMATOID FACTOR (H): ICD-10-CM

## 2025-07-14 DIAGNOSIS — Z79.899 HIGH RISK MEDICATION USE: ICD-10-CM

## 2025-07-14 LAB
ALBUMIN SERPL BCG-MCNC: 4.1 G/DL (ref 3.5–5.2)
ALP SERPL-CCNC: 26 U/L (ref 40–150)
ALT SERPL W P-5'-P-CCNC: 17 U/L (ref 0–50)
AST SERPL W P-5'-P-CCNC: 31 U/L (ref 0–45)
BASOPHILS # BLD AUTO: 0 10E3/UL (ref 0–0.2)
BASOPHILS NFR BLD AUTO: 0 %
BILIRUB SERPL-MCNC: 0.5 MG/DL
BILIRUBIN DIRECT (ROCHE PRO & PURE): 0.17 MG/DL (ref 0–0.45)
CREAT SERPL-MCNC: 0.83 MG/DL (ref 0.51–0.95)
CRP SERPL-MCNC: <3 MG/L
EGFRCR SERPLBLD CKD-EPI 2021: 68 ML/MIN/1.73M2
EOSINOPHIL # BLD AUTO: 0.1 10E3/UL (ref 0–0.7)
EOSINOPHIL NFR BLD AUTO: 2 %
ERYTHROCYTE [DISTWIDTH] IN BLOOD BY AUTOMATED COUNT: 16.8 % (ref 10–15)
ERYTHROCYTE [SEDIMENTATION RATE] IN BLOOD BY WESTERGREN METHOD: 35 MM/HR (ref 0–30)
HCT VFR BLD AUTO: 35.8 % (ref 35–47)
HGB BLD-MCNC: 12 G/DL (ref 11.7–15.7)
IMM GRANULOCYTES # BLD: 0 10E3/UL
IMM GRANULOCYTES NFR BLD: 0 %
LYMPHOCYTES # BLD AUTO: 1.3 10E3/UL (ref 0.8–5.3)
LYMPHOCYTES NFR BLD AUTO: 28 %
MCH RBC QN AUTO: 29.9 PG (ref 26.5–33)
MCHC RBC AUTO-ENTMCNC: 33.5 G/DL (ref 31.5–36.5)
MCV RBC AUTO: 89 FL (ref 78–100)
MONOCYTES # BLD AUTO: 0.5 10E3/UL (ref 0–1.3)
MONOCYTES NFR BLD AUTO: 11 %
NEUTROPHILS # BLD AUTO: 2.8 10E3/UL (ref 1.6–8.3)
NEUTROPHILS NFR BLD AUTO: 59 %
PLATELET # BLD AUTO: 222 10E3/UL (ref 150–450)
PROT SERPL-MCNC: 6.9 G/DL (ref 6.4–8.3)
RBC # BLD AUTO: 4.02 10E6/UL (ref 3.8–5.2)
WBC # BLD AUTO: 4.7 10E3/UL (ref 4–11)

## 2025-07-14 PROCEDURE — 85025 COMPLETE CBC W/AUTO DIFF WBC: CPT

## 2025-07-14 PROCEDURE — 86140 C-REACTIVE PROTEIN: CPT

## 2025-07-14 PROCEDURE — 82565 ASSAY OF CREATININE: CPT

## 2025-07-14 PROCEDURE — 80076 HEPATIC FUNCTION PANEL: CPT

## 2025-07-14 PROCEDURE — 36415 COLL VENOUS BLD VENIPUNCTURE: CPT

## 2025-07-14 PROCEDURE — 85652 RBC SED RATE AUTOMATED: CPT

## 2025-07-29 DIAGNOSIS — M05.79 RHEUMATOID ARTHRITIS INVOLVING MULTIPLE SITES WITH POSITIVE RHEUMATOID FACTOR (H): ICD-10-CM

## 2025-07-29 NOTE — TELEPHONE ENCOUNTER
Medication:   Folic acid 1mg  Last written on:   10/21/2024  Quantity:   90    Refills:   2    Last office visit:   04/21/2025  Next office visit:   09/09/2025  Last labs:   07/14/2025  Next lab:

## 2025-07-30 RX ORDER — FOLIC ACID 1 MG/1
1 TABLET ORAL DAILY
Qty: 90 TABLET | Refills: 0 | Status: SHIPPED | OUTPATIENT
Start: 2025-07-30

## 2025-07-30 NOTE — TELEPHONE ENCOUNTER
After chart review and based on prior discussion with MD it was noted that this is appropriate for a refill.    KINGA CabreraN RN Specialty Triage 7/30/2025 3:46 PM